# Patient Record
Sex: MALE | Race: WHITE | Employment: UNEMPLOYED | URBAN - METROPOLITAN AREA
[De-identification: names, ages, dates, MRNs, and addresses within clinical notes are randomized per-mention and may not be internally consistent; named-entity substitution may affect disease eponyms.]

---

## 2017-08-08 ENCOUNTER — OFFICE VISIT (OUTPATIENT)
Dept: INTERNAL MEDICINE CLINIC | Facility: CLINIC | Age: 62
End: 2017-08-08

## 2017-08-08 VITALS
HEART RATE: 52 BPM | TEMPERATURE: 97.6 F | BODY MASS INDEX: 34.65 KG/M2 | WEIGHT: 242 LBS | HEIGHT: 70 IN | DIASTOLIC BLOOD PRESSURE: 79 MMHG | SYSTOLIC BLOOD PRESSURE: 154 MMHG | RESPIRATION RATE: 18 BRPM

## 2017-08-08 DIAGNOSIS — Z86.73 HISTORY OF STROKE: ICD-10-CM

## 2017-08-08 DIAGNOSIS — G47.30 SLEEP APNEA, UNSPECIFIED TYPE: ICD-10-CM

## 2017-08-08 DIAGNOSIS — F32.A DEPRESSION, UNSPECIFIED DEPRESSION TYPE: ICD-10-CM

## 2017-08-08 DIAGNOSIS — J44.9 CHRONIC OBSTRUCTIVE PULMONARY DISEASE, UNSPECIFIED COPD TYPE (HCC): ICD-10-CM

## 2017-08-08 DIAGNOSIS — R94.31 ABNORMAL EKG: ICD-10-CM

## 2017-08-08 DIAGNOSIS — E11.40 TYPE 2 DIABETES MELLITUS WITH DIABETIC NEUROPATHY, WITHOUT LONG-TERM CURRENT USE OF INSULIN (HCC): ICD-10-CM

## 2017-08-08 DIAGNOSIS — F17.200 NEEDS SMOKING CESSATION EDUCATION: ICD-10-CM

## 2017-08-08 DIAGNOSIS — I10 ESSENTIAL HYPERTENSION: ICD-10-CM

## 2017-08-08 DIAGNOSIS — L98.9 SKIN LESION OF CHEEK: ICD-10-CM

## 2017-08-08 DIAGNOSIS — I63.9 CEREBROVASCULAR ACCIDENT (CVA), UNSPECIFIED MECHANISM (HCC): Primary | ICD-10-CM

## 2017-08-08 LAB
ALBUMIN UR QL STRIP: 10 MG/L
CREATININE, URINE POC: 50 MG/DL
HBA1C MFR BLD HPLC: 9.6 %
MICROALBUMIN/CREAT RATIO POC: <30 MG/G

## 2017-08-08 RX ORDER — METFORMIN HYDROCHLORIDE 1000 MG/1
1000 TABLET ORAL 2 TIMES DAILY WITH MEALS
Refills: 3 | Status: ON HOLD | COMMUNITY
Start: 2017-05-16 | End: 2021-05-28 | Stop reason: SDUPTHER

## 2017-08-08 RX ORDER — SERTRALINE HYDROCHLORIDE 50 MG/1
TABLET, FILM COATED ORAL
Refills: 0 | COMMUNITY
Start: 2017-05-14 | End: 2017-08-08 | Stop reason: DRUGHIGH

## 2017-08-08 RX ORDER — LANCETS
EACH MISCELLANEOUS
Qty: 1 EACH | Refills: 11 | Status: SHIPPED | OUTPATIENT
Start: 2017-08-08 | End: 2021-05-24

## 2017-08-08 RX ORDER — PERPHENAZINE 4 MG/1
TABLET, FILM COATED ORAL
Refills: 0 | COMMUNITY
Start: 2017-07-18 | End: 2021-05-24

## 2017-08-08 RX ORDER — PANTOPRAZOLE SODIUM 40 MG/1
40 TABLET, DELAYED RELEASE ORAL DAILY
Refills: 3 | Status: ON HOLD | COMMUNITY
Start: 2017-06-02 | End: 2021-05-28 | Stop reason: SDUPTHER

## 2017-08-08 RX ORDER — CHOLECALCIFEROL TAB 125 MCG (5000 UNIT) 125 MCG
5000 TAB ORAL DAILY
Status: ON HOLD | COMMUNITY
End: 2021-05-28 | Stop reason: SDUPTHER

## 2017-08-08 RX ORDER — INSULIN PUMP SYRINGE, 3 ML
EACH MISCELLANEOUS
Qty: 1 KIT | Refills: 0 | Status: SHIPPED | OUTPATIENT
Start: 2017-08-08 | End: 2021-05-24

## 2017-08-08 RX ORDER — CLOPIDOGREL BISULFATE 75 MG/1
75 TABLET ORAL DAILY
Refills: 0 | Status: ON HOLD | COMMUNITY
Start: 2017-06-15 | End: 2021-05-28 | Stop reason: SDUPTHER

## 2017-08-08 RX ORDER — LISINOPRIL 40 MG/1
40 TABLET ORAL DAILY
Refills: 3 | Status: ON HOLD | COMMUNITY
Start: 2017-06-20 | End: 2021-05-28 | Stop reason: SDUPTHER

## 2017-08-08 RX ORDER — GLIPIZIDE 10 MG/1
TABLET ORAL
Refills: 1 | COMMUNITY
Start: 2017-07-07 | End: 2021-05-24

## 2017-08-08 RX ORDER — ATORVASTATIN CALCIUM 80 MG/1
TABLET, FILM COATED ORAL
Refills: 3 | COMMUNITY
Start: 2017-05-16 | End: 2021-05-24

## 2017-08-08 RX ORDER — TRAZODONE HYDROCHLORIDE 100 MG/1
100 TABLET ORAL
Refills: 3 | Status: ON HOLD | COMMUNITY
Start: 2017-05-16 | End: 2021-05-28 | Stop reason: SDUPTHER

## 2017-08-08 RX ORDER — SERTRALINE HYDROCHLORIDE 100 MG/1
200 TABLET, FILM COATED ORAL DAILY
Refills: 3 | Status: ON HOLD | COMMUNITY
Start: 2017-05-16 | End: 2021-05-28 | Stop reason: SDUPTHER

## 2017-08-08 NOTE — MR AVS SNAPSHOT
Visit Information Date & Time Provider Department Dept. Phone Encounter #  
 8/8/2017 10:15 AM Orly Ambrosio NP [facility] [phone] [id number] Follow-up Instructions Return in about 3 months (around 11/8/2017) for Please sign record release forms at . Upcoming Health Maintenance Date Due Pneumococcal 19-64 Medium Risk (1 of 1 - PPSV23) 8/10/1973 DTaP/Tdap/Td series (1 - Tdap) 8/10/1975 FOBT Q 1 YEAR AGE 50-75 8/10/2004 ZOSTER VACCINE AGE 60> 6/10/2014 INFLUENZA AGE 9 TO ADULT 8/1/2017 Allergies as of 8/8/2017  Review Complete On: 8/8/2017 By: Orly Ambrosio NP Severity Noted Reaction Type Reactions Egg  09/05/2012    Swelling Current Immunizations  Never Reviewed No immunizations on file. Not reviewed this visit You Were Diagnosed With   
  
 Codes Comments Cerebrovascular accident (CVA), unspecified mechanism ([facility]    -  Primary ICD-10-CM: I63.9 ICD-9-CM: 434.91 Type 2 diabetes mellitus with diabetic neuropathy, without long-term current use of insulin (HCC)     ICD-10-CM: E11.40 ICD-9-CM: 250.60, 357.2 Essential hypertension     ICD-10-CM: I10 
ICD-9-CM: 401.9 Needs smoking cessation education     ICD-10-CM: F17.200 ICD-9-CM: V62.3 Depression, unspecified depression type     ICD-10-CM: F32.9 ICD-9-CM: 376 History of stroke     ICD-10-CM: Z86.73 
ICD-9-CM: V12.54 Skin lesion of cheek     ICD-10-CM: L98.9 ICD-9-CM: 709.9 Chronic obstructive pulmonary disease, unspecified COPD type ([facility]     ICD-10-CM: J44.9 ICD-9-CM: 493 Sleep apnea, unspecified type     ICD-10-CM: G47.30 ICD-9-CM: 780.57 Vitals BP Pulse Temp Resp Height(growth percentile) Weight(growth percentile) 154/79 (BP 1 Location: Right arm, BP Patient Position: Sitting) (!) 52 97.6 °F (36.4 °C) (Oral) 18 5' 10\" (1.778 m) 242 lb (109.8 kg) BMI Smoking Status 34.72 kg/m2 Current Every Day Smoker Vitals History BMI and BSA Data Body Mass Index Body Surface Area 34.72 kg/m 2 2.33 m 2 Preferred Pharmacy Pharmacy Name Phone [id number], [address] [facility] [phone] Your Updated Medication List  
  
   
This list is accurate as of: 8/8/17 11:13 AM.  Always use your most recent med list.  
  
  
  
  
 atorvastatin 80 mg tablet Commonly known as:  LIPITOR  
TAKE 1 TABLET BY MOUTH EVERY DAY  
  
 cholecalciferol (VITAMIN D3) 5,000 unit Tab tablet Commonly known as:  VITAMIN D3 Take  by mouth daily. clopidogrel 75 mg Tab Commonly known as:  PLAVIX TAKE ONE TABLET BY MOUTH DAILY  
  
 glipiZIDE 10 mg tablet Commonly known as:  GLUCOTROL  
TAKE 1 TABLET BY ORAL ROUTE 2 TIMES EVERY DAY BEFORE A MEAL FOR E11.69  
  
 lisinopril 40 mg tablet Commonly known as:  PRINIVIL, ZESTRIL  
TAKE 1 TABLET BY MOUTH EVERY DAY  
  
 metFORMIN 1,000 mg tablet Commonly known as:  GLUCOPHAGE  
TAKE 1 TABLET BY MOUTH TWICE A DAY pantoprazole 40 mg tablet Commonly known as:  PROTONIX  
TAKE 1 TABLET BY MOUTH EVERY DAY  
  
 perphenazine 4 mg tablet Commonly known as:  TRILAFON  
TAKE 1 TABLET BY MOUTH AT BEDTIME  
  
 sertraline 100 mg tablet Commonly known as:  ZOLOFT  
TAKE 1 TABLET BY MOUTH EVERY DAY  
  
 traZODone 100 mg tablet Commonly known as:  DESYREL  
TAKE 1 TABLET BY MOUTH EVERY DAY AT BEDTIME We Performed the Following AMB POC EKG ROUTINE W/ 12 LEADS, INTER & REP [70909 CPT(R)] AMB POC HEMOGLOBIN A1C [75663 CPT(R)] AMB POC URINE, MICROALBUMIN, SEMIQUANT (3 RESULTS) [26099 CPT(R)] REFERRAL TO CARDIOLOGY [NWT07 Custom] REFERRAL TO DERMATOLOGY [REF19 Custom] REFERRAL TO NEUROLOGY [HWJ67 Custom] REFERRAL TO PULMONARY DISEASE [WFH66 Custom] Comments:  
 Ronan Newsome. MD Ori 
[facility] [address] [facility] [address] Phone: [phone] Fax: [phone] REFERRAL TO SLEEP STUDIES [REF99 Custom] Follow-up Instructions Return in about 3 months (around 11/8/2017) for Please sign record release forms at . Referral Information Referral ID Referred By Referred To  
  
 [id number] SKIFF, [address], DO   
   [address] [facility] [address] [facility] Kit [place], [place] Phone: [phone] Fax: [phone] Visits Status Start Date End Date 1 Authorized 8/8/17 8/8/18 If your referral has a status of pending review or denied, additional information will be sent to support the outcome of this decision. Referral ID Referred By Referred To  
 [id number] SKIFF, [place], [address] [place] [address], [facility] Phone: [phone] Fax: [phone] Visits Status Start Date End Date 1 New Request 8/8/17 8/8/18 If your referral has a status of pending review or denied, additional information will be sent to support the outcome of this decision. Referral ID Referred By Referred To  
 [id number] SKIFF, [place] Raven, NP  
   [address] [place] [address] [address] Phone: [phone] Fax: [phone] Visits Status Start Date End Date 1 New Request 8/8/17 8/8/18 If your referral has a status of pending review or denied, additional information will be sent to support the outcome of this decision. Referral ID Referred By Referred To  
 [id number] Vandana Harris [facility] [address] [facility], [address] [facility] Visits Status Start Date End Date 1 New Request 8/8/17 8/8/18 If your referral has a status of pending review or denied, additional information will be sent to support the outcome of this decision. Referral ID Referred By Referred To  
 [id number] SKIFF, Karolyn Balls, MD  
   [address] [place] [address] Karen Zheng Phone: [phone] Fax: [phone] Visits Status Start Date End Date 1 New Request 8/8/17 8/8/18 If your referral has a status of pending review or denied, additional information will be sent to support the outcome of this decision. Patient Instructions Secondhand Smoke: Care Instructions Your Care Instructions Secondhand smoke comes from the burning end of a cigarette, cigar, or pipe and the smoke that a smoker exhales. The smoke contains nicotine and many other harmful chemicals. Breathing secondhand smoke can cause or worsen health problems including cancer, asthma, coronary artery disease, and respiratory infections. It can make your eyes and nose burn and cause a sore throat. Secondhand smoke is especially bad for babies and young children whose lungs are still developing. Babies whose parents smoke are more likely to have ear infections, pneumonia, and bronchitis in the first few years of their lives. Secondhand smoke can make asthma symptoms worse in children. If you are pregnant, it is important that you not smoke and that you avoid secondhand smoke. You are more likely to give birth to a baby who weighs less than expected (low birth weight) if you smoke. And your baby may have a greater risk for sudden infant death syndrome (SIDS). Babies whose mothers are exposed to secondhand smoke during pregnancy have a higher risk for health problems. Follow-up care is a key part of your treatment and safety. Be sure to make and go to all appointments, and call your doctor if you are having problems. It's also a good idea to know your test results and keep a list of the medicines you take. How can you care for yourself at home? · Do not smoke or let anyone else smoke in your home. If people must smoke, ask them to go outside. · If people do smoke in your home, choose a room where you can open a window or use a fan to get the smoke outside. · Do not let anyone smoke in your car. If someone must smoke, pull over in a safe place and let him or her smoke away from the car. · Ask your employer to make sure that you have a smoke-free work area. · Make sure that your children are not exposed to secondhand smoke at day care, school, and after-school programs. · Try to choose nonsmoking bars, restaurants, and other public places when you go out. · Help your family and friends who smoke to quit by encouraging them to try. Tell them about treatment resources. Having support from others often helps. · If you smoke, quit. Quitting is hard, but there are ways to boost your chance of quitting tobacco for good. ¨ Use nicotine gum, patches, or lozenges. Call a quitline. Ask your doctor about stop-smoking programs and medicines. ¨ Keep trying. When should you call for help? Watch closely for changes in your health, and be sure to contact your doctor if you have any problems. Where can you learn more? Go to http://figueroaReactivitymarilyn.info/. Enter L004 in the search box to learn more about \"Secondhand Smoke: Care Instructions. \" Current as of: March 20, 2017 Content Version: 11.3 © 8692-1675 [employer]. Care instructions adapted under license by Celect (which disclaims liability or warranty for this information). If you have questions about a medical condition or this instruction, always ask your healthcare professional. [place] [address] any warranty or liability for your use of this information. Learning About Benefits From Quitting Smoking How does quitting smoking make you healthier? If you're thinking about quitting smoking, you may have a few reasons to be smoke-free. Your health may be one of them.  
· When you quit smoking, you lower your risks for cancer, lung disease, heart attack, stroke, blood vessel disease, and blindness from macular degeneration. · When you're smoke-free, you get sick less often, and you heal faster. You are less likely to get colds, flu, bronchitis, and pneumonia. · As a nonsmoker, you may find that your mood is better and you are less stressed. When and how will you feel healthier? Quitting has real health benefits that start from day 1 of being smoke-free. And the longer you stay smoke-free, the healthier you get and the better you feel. The first hours · After just 20 minutes, your blood pressure and heart rate go down. That means there's less stress on your heart and blood vessels. · Within 12 hours, the level of carbon monoxide in your blood drops back to normal. That makes room for more oxygen. With more oxygen in your body, you may notice that you have more energy than when you smoked. After 2 weeks · Your lungs start to work better. · Your risk of heart attack starts to drop. After 1 month · When your lungs are clear, you cough less and breathe deeper, so it's easier to be active. · Your sense of taste and smell return. That means you can enjoy food more than you have since you started smoking. Over the years · After 1 year, your risk of heart disease is half what it would be if you kept smoking. · After 5 years, your risk of stroke starts to shrink. Within a few years after that, it's about the same as if you'd never smoked. · After 10 years, your risk of dying from lung cancer is cut by about half. And your risk for many other types of cancer is lower too. How would quitting help others in your life? When you quit smoking, you improve the health of everyone who now breathes in your smoke. · Their heart, lung, and cancer risks drop, much like yours. · They are sick less. For babies and small children, living smoke-free means they're less likely to have ear infections, pneumonia, and bronchitis. · If you're a woman who is or will be pregnant someday, quitting smoking means a healthier . · Children who are close to you are less likely to become adult smokers. Where can you learn more? Go to http://figueroa-marilyn.info/. Enter [id number] in the search box to learn more about \"Learning About Benefits From Quitting Smoking. \" Current as of: 2017 Content Version: 11.3 © 2044-9285 [employer]. Care instructions adapted under license by Visto (which disclaims liability or warranty for this information). If you have questions about a medical condition or this instruction, always ask your healthcare professional. [address] any warranty or liability for your use of this information. Deciding About Using Medicines To Quit Smoking What are the medicines you can use? Your doctor may prescribe varenicline (Chantix) or bupropion (Zyban). These medicines can help you cope with cravings for tobacco. They are pills that don't contain nicotine. You also can use nicotine replacement products. These do contain nicotine. There are many types. · Gum and lozenges slowly release nicotine into your mouth. · Patches stick to your skin. They slowly release nicotine into your bloodstream. 
· An inhaler has a bianchi that contains nicotine. You breathe in a puff of nicotine vapor through your mouth and throat. · Nasal spray releases a mist that contains nicotine. What are key points about this decision? · Using medicines can double your chances of quitting smoking. They can ease cravings and withdrawal symptoms. · Getting counseling along with using medicine can raise your chances of quitting even more. · If you smoke fewer than 5 cigarettes a day, you may not need medicines to help you quit smoking. · These medicines have less nicotine than cigarettes.  And by itself, nicotine is not nearly as harmful as smoking. The tars, carbon monoxide, and other toxic chemicals in tobacco cause the harmful effects. · The side effects of nicotine replacement products depend on the type of product. For example, a patch can make your skin red and itchy. Medicines in pill form can make you sick to your stomach. They can also cause dry mouth and trouble sleeping. For most people, the side effects are not bad enough to make them stop using the products. · FDA warning. The U.S. Food and Drug Administration (FDA) warns that people who are taking bupropion or varenicline and who have any serious or unusual changes in mood or behavior or who feel like hurting themselves or someone else should stop taking the medicine and call a doctor right away. If you already have a mood or behavior problem, be sure to tell your doctor before you decide to use these medicines. Why might you choose to use medicines to quit smoking? · You have tried on your own to stop smoking, but you were not able to stop. · You smoke more than 5 cigarettes a day. · You want to increase your chances of quitting smoking. · You want to reduce your cravings and withdrawal symptoms. · You feel the benefits of medicine outweigh the side effects. Why might you choose not to use medicine? · You want to try quitting on your own by stopping all at once (\"cold turkey\"). · You want to cut back slowly on the number of cigarettes you smoke. · You smoke fewer than 5 cigarettes a day. · You do not like using medicine. · You feel the side effects of medicines outweigh the benefits. · You are worried about the cost of medicines. Your decision Thinking about the facts and your feelings can help you make a decision that is right for you. Be sure you understand the benefits and risks of your options, and think about what else you need to do before you make the decision. Where can you learn more? Go to http://figueroa-marilyn.info/. Enter S228 in the search box to learn more about \"Deciding About Using Medicines To Quit Smoking. \" Current as of: March 20, 2017 Content Version: 11.3 © 5171-1176 [employer]. Care instructions adapted under license by Vivorte (which disclaims liability or warranty for this information). If you have questions about a medical condition or this instruction, always ask your healthcare professional. [address] any warranty or liability for your use of this information. Introducing [facility]! Carmelo Faust introduces [employer] patient portal. Now you can access parts of your medical record, email your doctor's office, and request medication refills online. 1. In your internet browser, go to [email or web address] 2. Click on the First Time User? Click Here link in the Sign In box. You will see the New Member Sign Up page. 3. Enter your [employer] Access Code exactly as it appears below. You will not need to use this code after youve completed the sign-up process. If you do not sign up before the expiration date, you must request a new code. · [employer] Access Code: [email or web address] Expires: 11/6/2017 11:13 AM 
 
4. Enter the last four digits of your Social Security Number (xxxx) and Date of Birth (mm/dd/yyyy) as indicated and click Submit. You will be taken to the next sign-up page. 5. Create a [employer] ID. This will be your [employer] login ID and cannot be changed, so think of one that is secure and easy to remember. 6. Create a [employer] password. You can change your password at any time. 7. Enter your Password Reset Question and Answer. This can be used at a later time if you forget your password. 8. Enter your e-mail address. You will receive e-mail notification when new information is available in [address] 9. Click Sign Up. You can now view and download portions of your medical record. 10. Click the Download Summary menu link to download a portable copy of your medical information. If you have questions, please visit the Frequently Asked Questions section of the RaySat website. Remember, [employer] is NOT to be used for urgent needs. For medical emergencies, dial 911. Now available from your iPhone and Android! Please provide this summary of care documentation to your next provider. Your primary care clinician is listed as Phys Other. If you have any questions after today's visit, please call [phone].

## 2017-08-08 NOTE — PROGRESS NOTES
Chief Complaint   Patient presents with    Establish Care     Pt reports he had a stroke about 6 weeks ago this was his 3rd stroke. 1. Have you been to the ER, urgent care clinic since your last visit? Hospitalized since your last visit? No    2. Have you seen or consulted any other health care providers outside of the [address] [place] [address] since your last visit? Include any pap smears or colon screening.  No

## 2017-08-08 NOTE — PROGRESS NOTES
Subjective:      Esequiel Mcfarland is a 57 y.o. male who is a new patient and is here to establish care and discuss recent stroke. Previous followed by Dr. Ru Christina. The following sections were reviewed & updated as appropriate: PMH, PL, PSH, FH, RxH, and SH. He will see Dr. Henrique Duran on August 15th to establish care. He does not have any specialists. He requests disability paperwork for [facility], discussed that I need previous records before doing that. Cardiovascular Review  The patient has hypertension and history of prior stroke. His last stroke was 6 weeks ago, he was seen at [facility].  He reports taking medications as instructed, no medication side effects noted. Diet and Lifestyle: not attempting to follow a low fat, low cholesterol diet, sedentary, smoker 1 pack daily. Labs: no lab studies available for review at time of visit. Lab Results   Component Value Date/Time    Sodium 140 10/23/2016 05:55 PM    Potassium 4.1 10/23/2016 05:55 PM       Endocrine Review  He is seen for diabetes. Since last visit he reports: no polyuria or polydipsia, no chest pain or dyspnea, no chest pain, dyspnea or TIA's, no numbness, tingling or pain in extremities, no unusual visual symptoms, no significant changes. Home glucose monitoring: is not performed   He reports medication compliance: compliant all of the time. Medication side effects: none. Diabetic diet compliance: noncompliant much of the time.   Lab review: A1c today is 9.6    Lab Results   Component Value Date/Time    Creatinine 0.73 10/23/2016 05:55 PM       No Diabetic HM Topics for this patient    Patient Active Problem List    Diagnosis Date Noted    Needs smoking cessation education 08/08/2017    History of stroke 08/08/2017    Depression 08/08/2017    Type 2 diabetes mellitus with diabetic neuropathy, without long-term current use of insulin ([facility] 08/08/2017    Essential hypertension 08/08/2017    Sleep apnea 08/08/2017    Alcohol-induced mood disorder ([facility] 09/06/2012     Current Outpatient Prescriptions   Medication Sig Dispense Refill    atorvastatin (LIPITOR) 80 mg tablet TAKE 1 TABLET BY MOUTH EVERY DAY  3    clopidogrel (PLAVIX) 75 mg tab TAKE ONE TABLET BY MOUTH DAILY  0    glipiZIDE (GLUCOTROL) 10 mg tablet TAKE 1 TABLET BY ORAL ROUTE 2 TIMES EVERY DAY BEFORE A MEAL FOR E11.69  1    lisinopril (PRINIVIL, ZESTRIL) 40 mg tablet TAKE 1 TABLET BY MOUTH EVERY DAY  3    metFORMIN (GLUCOPHAGE) 1,000 mg tablet TAKE 1 TABLET BY MOUTH TWICE A DAY  3    pantoprazole (PROTONIX) 40 mg tablet TAKE 1 TABLET BY MOUTH EVERY DAY  3    sertraline (ZOLOFT) 100 mg tablet TAKE 1 TABLET BY MOUTH EVERY DAY  3    traZODone (DESYREL) 100 mg tablet TAKE 1 TABLET BY MOUTH EVERY DAY AT BEDTIME  3    cholecalciferol, VITAMIN D3, (VITAMIN D3) 5,000 unit tab tablet Take  by mouth daily.  Blood-Glucose Meter monitoring kit Use twice daily to check blood sugars 1 Kit 0    Lancets misc Use twice daily to check blood sugars 1 Each 11    glucose blood VI test strips (ASCENSIA AUTODISC VI, ONE TOUCH ULTRA TEST VI) strip Use twice daily to check blood sugars 100 Strip 11    perphenazine (TRILAFON) 4 mg tablet TAKE 1 TABLET BY MOUTH AT BEDTIME  0     Allergies   Allergen Reactions    Egg Swelling     Past Medical History:   Diagnosis Date    Bipolar 2 disorder ([facility]     Depression     DM (diabetes mellitus) ([facility]     ETOH abuse     HTN (hypertension)     Stroke ([place] [address]      History reviewed. No pertinent family history. Social History   Substance Use Topics    Smoking status: Current Every Day Smoker     Packs/day: 1.00    Smokeless tobacco: Never Used    Alcohol use Yes      Comment: stopped drinking 4 years ago        Review of Systems    A comprehensive review of systems was negative except for that written in the HPI.      Objective:     Visit Vitals    /79 (BP 1 Location: Right arm, BP Patient Position: Sitting)    Pulse (!) 52    Temp 97.6 °F (36.4 °C) (Oral)    Resp 18    Ht 5' 10\" (1.778 m)    Wt 242 lb (109.8 kg)    BMI 34.72 kg/m2     General appearance: alert, cooperative, no distress, appears stated age, obese  Head: Normocephalic, without obvious abnormality, atraumatic  Eyes: pin point pupils, PERRL  Back: symmetric, no curvature. ROM normal. No CVA tenderness. Lungs: clear to auscultation bilaterally  Heart: regular rate and rhythm, S1, S2 normal, no murmur, click, rub or gallop  Extremities: extremities normal, atraumatic, no cyanosis or edema  Pulses: 2+ and symmetric  Skin: abnormal mole noted to right cheek  Neurologic: Alert and oriented X 3, normal strength and tone. Normal symmetric reflexes. Normal coordination and gait  Psych: appropriate mood, speech, affect, uses humor to deflect serious questions. Nursing note and vitals reviewed  Assessment/Plan:       ICD-10-CM ICD-9-CM    1. Cerebrovascular accident (CVA), unspecified mechanism ([facility] I63.9 434.91 REFERRAL TO NEUROLOGY      REFERRAL TO CARDIOLOGY   2. Type 2 diabetes mellitus with diabetic neuropathy, without long-term current use of insulin (HCC) E11.40 250.60 AMB POC HEMOGLOBIN A1C     357.2 AMB POC URINE, MICROALBUMIN, SEMIQUANT (3 RESULTS)      Blood-Glucose Meter monitoring kit      Lancets misc      glucose blood VI test strips (ASCENSIA AUTODISC VI, ONE TOUCH ULTRA TEST VI) strip   3. Essential hypertension I10 401.9 REFERRAL TO CARDIOLOGY      AMB POC EKG ROUTINE W/ 12 LEADS, INTER & REP   4. Abnormal EKG R94.31 794.31    5. Needs smoking cessation education F17.200 V62.3    6. Depression, unspecified depression type F32.9 311    7. History of stroke Z86.73 V12.54    8. Skin lesion of cheek L98.9 709.9 REFERRAL TO DERMATOLOGY   9. Chronic obstructive pulmonary disease, unspecified COPD type ([facility] [address] [facility] J44.9 496 REFERRAL TO PULMONARY DISEASE   10.  Sleep apnea, unspecified type G47.30 780.57 REFERRAL TO SLEEP STUDIES     Follow-up Disposition:  Return in about 3 months (around 11/8/2017) for Please sign record release forms at . Advised him to call back or return to office if symptoms worsen/change/persist.  Discussed expected course/resolution/complications of diagnosis in detail with patient. Medication risks/benefits/costs/interactions/alternatives discussed with patient. He was given an after visit summary which includes diagnoses, current medications, & vitals. He expressed understanding with the diagnosis and plan.

## 2017-08-08 NOTE — PATIENT INSTRUCTIONS
Secondhand Smoke: Care Instructions  Your Care Instructions  Secondhand smoke comes from the burning end of a cigarette, cigar, or pipe and the smoke that a smoker exhales. The smoke contains nicotine and many other harmful chemicals. Breathing secondhand smoke can cause or worsen health problems including cancer, asthma, coronary artery disease, and respiratory infections. It can make your eyes and nose burn and cause a sore throat. Secondhand smoke is especially bad for babies and young children whose lungs are still developing. Babies whose parents smoke are more likely to have ear infections, pneumonia, and bronchitis in the first few years of their lives. Secondhand smoke can make asthma symptoms worse in children. If you are pregnant, it is important that you not smoke and that you avoid secondhand smoke. You are more likely to give birth to a baby who weighs less than expected (low birth weight) if you smoke. And your baby may have a greater risk for sudden infant death syndrome (SIDS). Babies whose mothers are exposed to secondhand smoke during pregnancy have a higher risk for health problems. Follow-up care is a key part of your treatment and safety. Be sure to make and go to all appointments, and call your doctor if you are having problems. It's also a good idea to know your test results and keep a list of the medicines you take. How can you care for yourself at home? · Do not smoke or let anyone else smoke in your home. If people must smoke, ask them to go outside. · If people do smoke in your home, choose a room where you can open a window or use a fan to get the smoke outside. · Do not let anyone smoke in your car. If someone must smoke, pull over in a safe place and let him or her smoke away from the car. · Ask your employer to make sure that you have a smoke-free work area.   · Make sure that your children are not exposed to secondhand smoke at day care, school, and after-school programs. · Try to choose nonsmoking bars, restaurants, and other public places when you go out. · Help your family and friends who smoke to quit by encouraging them to try. Tell them about treatment resources. Having support from others often helps. · If you smoke, quit. Quitting is hard, but there are ways to boost your chance of quitting tobacco for good. ¨ Use nicotine gum, patches, or lozenges. Call a quitline. Ask your doctor about stop-smoking programs and medicines. ¨ Keep trying. When should you call for help? Watch closely for changes in your health, and be sure to contact your doctor if you have any problems. Where can you learn more? Go to http://figueroaBocadamarilyn.info/. Enter L004 in the search box to learn more about \"Secondhand Smoke: Care Instructions. \"  Current as of: March 20, 2017  Content Version: 11.3  © 2443-7695 [employer]. Care instructions adapted under license by [employer] (which disclaims liability or warranty for this information). If you have questions about a medical condition or this instruction, always ask your healthcare professional. [address] any warranty or liability for your use of this information. Learning About Benefits From Quitting Smoking  How does quitting smoking make you healthier? If you're thinking about quitting smoking, you may have a few reasons to be smoke-free. Your health may be one of them. · When you quit smoking, you lower your risks for cancer, lung disease, heart attack, stroke, blood vessel disease, and blindness from macular degeneration. · When you're smoke-free, you get sick less often, and you heal faster. You are less likely to get colds, flu, bronchitis, and pneumonia. · As a nonsmoker, you may find that your mood is better and you are less stressed. When and how will you feel healthier? Quitting has real health benefits that start from day 1 of being smoke-free. And the longer you stay smoke-free, the healthier you get and the better you feel. The first hours  · After just 20 minutes, your blood pressure and heart rate go down. That means there's less stress on your heart and blood vessels. · Within 12 hours, the level of carbon monoxide in your blood drops back to normal. That makes room for more oxygen. With more oxygen in your body, you may notice that you have more energy than when you smoked. After 2 weeks  · Your lungs start to work better. · Your risk of heart attack starts to drop. After 1 month  · When your lungs are clear, you cough less and breathe deeper, so it's easier to be active. · Your sense of taste and smell return. That means you can enjoy food more than you have since you started smoking. Over the years  · After 1 year, your risk of heart disease is half what it would be if you kept smoking. · After 5 years, your risk of stroke starts to shrink. Within a few years after that, it's about the same as if you'd never smoked. · After 10 years, your risk of dying from lung cancer is cut by about half. And your risk for many other types of cancer is lower too. How would quitting help others in your life? When you quit smoking, you improve the health of everyone who now breathes in your smoke. · Their heart, lung, and cancer risks drop, much like yours. · They are sick less. For babies and small children, living smoke-free means they're less likely to have ear infections, pneumonia, and bronchitis. · If you're a woman who is or will be pregnant someday, quitting smoking means a healthier . · Children who are close to you are less likely to become adult smokers. Where can you learn more? Go to http://figueroa-marilyn.info/. Enter [id number] [id number] [id number] in the search box to learn more about \"Learning About Benefits From Quitting Smoking. \"  Current as of: 2017  Content Version: 11.3  © 6153-1689 [employer].  Care instructions adapted under license by [employer] (which disclaims liability or warranty for this information). If you have questions about a medical condition or this instruction, always ask your healthcare professional. [address] any warranty or liability for your use of this information. Deciding About Using Medicines To Quit Smoking  What are the medicines you can use? Your doctor may prescribe varenicline (Chantix) or bupropion (Zyban). These medicines can help you cope with cravings for tobacco. They are pills that don't contain nicotine. You also can use nicotine replacement products. These do contain nicotine. There are many types. · Gum and lozenges slowly release nicotine into your mouth. · Patches stick to your skin. They slowly release nicotine into your bloodstream.  · An inhaler has a bianchi that contains nicotine. You breathe in a puff of nicotine vapor through your mouth and throat. · Nasal spray releases a mist that contains nicotine. What are key points about this decision? · Using medicines can double your chances of quitting smoking. They can ease cravings and withdrawal symptoms. · Getting counseling along with using medicine can raise your chances of quitting even more. · If you smoke fewer than 5 cigarettes a day, you may not need medicines to help you quit smoking. · These medicines have less nicotine than cigarettes. And by itself, nicotine is not nearly as harmful as smoking. The tars, carbon monoxide, and other toxic chemicals in tobacco cause the harmful effects. · The side effects of nicotine replacement products depend on the type of product. For example, a patch can make your skin red and itchy. Medicines in pill form can make you sick to your stomach. They can also cause dry mouth and trouble sleeping. For most people, the side effects are not bad enough to make them stop using the products. · FDA warning. The U.S.  Food and Drug Administration (FDA) warns that people who are taking bupropion or varenicline and who have any serious or unusual changes in mood or behavior or who feel like hurting themselves or someone else should stop taking the medicine and call a doctor right away. If you already have a mood or behavior problem, be sure to tell your doctor before you decide to use these medicines. Why might you choose to use medicines to quit smoking? · You have tried on your own to stop smoking, but you were not able to stop. · You smoke more than 5 cigarettes a day. · You want to increase your chances of quitting smoking. · You want to reduce your cravings and withdrawal symptoms. · You feel the benefits of medicine outweigh the side effects. Why might you choose not to use medicine? · You want to try quitting on your own by stopping all at once (\"cold turkey\"). · You want to cut back slowly on the number of cigarettes you smoke. · You smoke fewer than 5 cigarettes a day. · You do not like using medicine. · You feel the side effects of medicines outweigh the benefits. · You are worried about the cost of medicines. Your decision  Thinking about the facts and your feelings can help you make a decision that is right for you. Be sure you understand the benefits and risks of your options, and think about what else you need to do before you make the decision. Where can you learn more? Go to http://figueroa-marilyn.info/. Enter O857 in the search box to learn more about \"Deciding About Using Medicines To Quit Smoking. \"  Current as of: March 20, 2017  Content Version: 11.3  © 1565-9817 [employer]. Care instructions adapted under license by [employer] (which disclaims liability or warranty for this information).  If you have questions about a medical condition or this instruction, always ask your healthcare professional. [place] [address] any warranty or liability for your use of this information.

## 2021-05-23 ENCOUNTER — HOSPITAL ENCOUNTER (INPATIENT)
Age: 66
LOS: 4 days | Discharge: HOME | DRG: 885 | End: 2021-05-28
Attending: EMERGENCY MEDICINE | Admitting: PSYCHIATRY & NEUROLOGY
Payer: MEDICARE

## 2021-05-23 DIAGNOSIS — F43.21 ADJUSTMENT DISORDER WITH DEPRESSED MOOD: ICD-10-CM

## 2021-05-23 DIAGNOSIS — F10.10 ALCOHOL ABUSE: ICD-10-CM

## 2021-05-23 DIAGNOSIS — E11.40 TYPE 2 DIABETES MELLITUS WITH DIABETIC NEUROPATHY, WITHOUT LONG-TERM CURRENT USE OF INSULIN (HCC): ICD-10-CM

## 2021-05-23 DIAGNOSIS — F19.94 SUBSTANCE INDUCED MOOD DISORDER (HCC): Primary | ICD-10-CM

## 2021-05-23 DIAGNOSIS — F25.1 SCHIZOAFFECTIVE DISORDER, DEPRESSIVE TYPE (HCC): ICD-10-CM

## 2021-05-23 DIAGNOSIS — I10 ESSENTIAL HYPERTENSION: ICD-10-CM

## 2021-05-23 DIAGNOSIS — F10.20 ALCOHOL USE DISORDER, MODERATE, DEPENDENCE (HCC): ICD-10-CM

## 2021-05-23 LAB
ALBUMIN SERPL-MCNC: 4.1 G/DL (ref 3.5–5)
ALBUMIN/GLOB SERPL: 1.2 {RATIO} (ref 1.1–2.2)
ALP SERPL-CCNC: 59 U/L (ref 45–117)
ALT SERPL-CCNC: 68 U/L (ref 12–78)
AMPHET UR QL SCN: NEGATIVE
ANION GAP SERPL CALC-SCNC: 12 MMOL/L (ref 5–15)
AST SERPL-CCNC: 36 U/L (ref 15–37)
BARBITURATES UR QL SCN: NEGATIVE
BASOPHILS # BLD: 0.1 K/UL (ref 0–0.1)
BASOPHILS NFR BLD: 2 % (ref 0–1)
BENZODIAZ UR QL: NEGATIVE
BILIRUB SERPL-MCNC: 0.3 MG/DL (ref 0.2–1)
BUN SERPL-MCNC: 8 MG/DL (ref 6–20)
BUN/CREAT SERPL: 9 (ref 12–20)
CALCIUM SERPL-MCNC: 9.1 MG/DL (ref 8.5–10.1)
CANNABINOIDS UR QL SCN: NEGATIVE
CHLORIDE SERPL-SCNC: 98 MMOL/L (ref 97–108)
CO2 SERPL-SCNC: 26 MMOL/L (ref 21–32)
COCAINE UR QL SCN: NEGATIVE
CREAT SERPL-MCNC: 0.93 MG/DL (ref 0.7–1.3)
DIFFERENTIAL METHOD BLD: ABNORMAL
DRUG SCRN COMMENT,DRGCM: NORMAL
EOSINOPHIL # BLD: 0.1 K/UL (ref 0–0.4)
EOSINOPHIL NFR BLD: 3 % (ref 0–7)
ERYTHROCYTE [DISTWIDTH] IN BLOOD BY AUTOMATED COUNT: 12.6 % (ref 11.5–14.5)
ETHANOL SERPL-MCNC: 154 MG/DL
FLUAV RNA SPEC QL NAA+PROBE: NOT DETECTED
FLUBV RNA SPEC QL NAA+PROBE: NOT DETECTED
GLOBULIN SER CALC-MCNC: 3.4 G/DL (ref 2–4)
GLUCOSE SERPL-MCNC: 185 MG/DL (ref 65–100)
HCT VFR BLD AUTO: 40 % (ref 36.6–50.3)
HGB BLD-MCNC: 14.1 G/DL (ref 12.1–17)
IMM GRANULOCYTES # BLD AUTO: 0 K/UL (ref 0–0.04)
IMM GRANULOCYTES NFR BLD AUTO: 0 % (ref 0–0.5)
LYMPHOCYTES # BLD: 1.2 K/UL (ref 0.8–3.5)
LYMPHOCYTES NFR BLD: 25 % (ref 12–49)
MCH RBC QN AUTO: 32 PG (ref 26–34)
MCHC RBC AUTO-ENTMCNC: 35.3 G/DL (ref 30–36.5)
MCV RBC AUTO: 90.7 FL (ref 80–99)
METHADONE UR QL: NEGATIVE
MONOCYTES # BLD: 0.3 K/UL (ref 0–1)
MONOCYTES NFR BLD: 7 % (ref 5–13)
NEUTS SEG # BLD: 3 K/UL (ref 1.8–8)
NEUTS SEG NFR BLD: 63 % (ref 32–75)
NRBC # BLD: 0 K/UL (ref 0–0.01)
NRBC BLD-RTO: 0 PER 100 WBC
OPIATES UR QL: NEGATIVE
PCP UR QL: NEGATIVE
PLATELET # BLD AUTO: 165 K/UL (ref 150–400)
PMV BLD AUTO: 10.4 FL (ref 8.9–12.9)
POTASSIUM SERPL-SCNC: 3.9 MMOL/L (ref 3.5–5.1)
PROT SERPL-MCNC: 7.5 G/DL (ref 6.4–8.2)
RBC # BLD AUTO: 4.41 M/UL (ref 4.1–5.7)
SARS-COV-2, COV2: NOT DETECTED
SODIUM SERPL-SCNC: 136 MMOL/L (ref 136–145)
WBC # BLD AUTO: 4.7 K/UL (ref 4.1–11.1)

## 2021-05-23 PROCEDURE — 36415 COLL VENOUS BLD VENIPUNCTURE: CPT

## 2021-05-23 PROCEDURE — 80053 COMPREHEN METABOLIC PANEL: CPT

## 2021-05-23 PROCEDURE — 85025 COMPLETE CBC W/AUTO DIFF WBC: CPT

## 2021-05-23 PROCEDURE — 87636 SARSCOV2 & INF A&B AMP PRB: CPT

## 2021-05-23 PROCEDURE — 99285 EMERGENCY DEPT VISIT HI MDM: CPT

## 2021-05-23 PROCEDURE — 74011250637 HC RX REV CODE- 250/637: Performed by: EMERGENCY MEDICINE

## 2021-05-23 PROCEDURE — 82077 ASSAY SPEC XCP UR&BREATH IA: CPT

## 2021-05-23 PROCEDURE — 74011250637 HC RX REV CODE- 250/637: Performed by: NURSE PRACTITIONER

## 2021-05-23 PROCEDURE — 80307 DRUG TEST PRSMV CHEM ANLYZR: CPT

## 2021-05-23 RX ORDER — IBUPROFEN 200 MG
1 TABLET ORAL DAILY
Status: DISCONTINUED | OUTPATIENT
Start: 2021-05-23 | End: 2021-05-28 | Stop reason: HOSPADM

## 2021-05-23 RX ORDER — IBUPROFEN 200 MG
1 TABLET ORAL DAILY
Status: DISCONTINUED | OUTPATIENT
Start: 2021-05-24 | End: 2021-05-23

## 2021-05-23 RX ORDER — LORAZEPAM 1 MG/1
1 TABLET ORAL
Status: COMPLETED | OUTPATIENT
Start: 2021-05-23 | End: 2021-05-23

## 2021-05-23 RX ADMIN — LORAZEPAM 1 MG: 1 TABLET ORAL at 22:05

## 2021-05-23 NOTE — ED NOTES
Bedside shift change report given to Sara Dan RN (oncoming nurse) by Davian Escobar RN (offgoing nurse). Report included the following information SBAR, ED Summary and Recent Results.

## 2021-05-23 NOTE — BSMART NOTE
Comprehensive Assessment Form Part 1 Section I - Disposition Axis I - Substance Induced Mood Disorder, Alcohol Dependence Axis II - Deferred Axis III - Hypertension, Diabetes, Hx of stroke, COPD Axis IV - Treatment noncompliance, Chronic substance abuse [place] - 35 The Medical Doctor to Psychiatrist conference was not completed. The Medical Doctor is in agreement with Psychiatrist disposition because of (reason) patient is requesting voluntary admission. The plan is admit to appropriate local bed. The on-call Psychiatrist consulted was Dr. Halie Russell. The admitting Psychiatrist will be Dr. Halie Russell. The admitting Diagnosis is Substance Induced Mood Disorder. The Payor source is [employer]. Section II - Integrated Summary Summary:  Patient is a 76year old male seen face to face in the ER. He came to the ER reporting suicidal ideation with a plan to  off a bridge. He also told this clinician he would \"jab myself in the heart,\" but did not specify with what device. He reported he has been off psychiatric medications for about a year after Dr. Sneha Burch stopped seeing outpatient clients at the clinic he went to. He has received no outpatient mental health or substance abuse treatment since then. He has a long history of alcoholism since age 14. He reported he is drinking daily but his only withdrawal symptoms are slight tremors. He reported he has had a 5 year period of sobriety in the past \"a few years back. \"  He reported being an alcoholic is making him depressed. His cat is also sick. He denied homicidal ideation and symptoms of psychosis. He reported he has been taking his wife's Trazodone to sleep which helps. His appetite comes and goes. He has multiple past psychiatric admissions, but has not been admitted to a [facility] facility since he was at [facility] in September 2012.   He reported his most recent admission was at [facility] in late 2019 or early 2020 before COVID lockdowns. He is requesting voluntary admission. The patient has demonstrated mental capacity to provide informed consent. The information is given by the patient and past medical records. The Chief Complaint is mental health problem. The Precipitant Factors are treatment noncompliance, chronic substance abuse. Previous Hospitalizations: yes The patient has not previously been in restraints. Current Psychiatrist and/or  is NA. Lethality Assessment: 
 
The potential for suicide is noted by the following: defined plan, ideation and current substance abuse . The potential for homicide is not noted. The patient has not been a perpetrator of sexual or physical abuse. There are not pending charges. The patient is felt to be at risk for self harm or harm to others. The attending nurse was advised the patient needs supervision. Section III - Psychosocial 
The patient's overall mood and attitude is withdrawn. Feelings of helplessness and hopelessness are not observed. Generalized anxiety is not observed. Panic is not observed. Phobias are not observed. Obsessive compulsive tendencies are not observed. Section IV - Mental Status Exam 
The patient's appearance is unkempt. The patient's behavior shows no evidence of impairment. The patient is oriented to time, place, person and situation. The patient's speech shows no evidence of impairment. The patient's mood is withdrawn. The range of affect is constricted. The patient's thought content demonstrates no evidence of impairment. The thought process shows no evidence of impairment. The patient's perception shows no evidence of impairment. The patient's memory shows no evidence of impairment. The patient's appetite is decreased. The patient's sleep shows no evidence of impairment. The patient's insight is blaming. The patient's judgement is psychologically impaired. Section V - Substance Abuse The patient is using substances. The patient is using alcohol for greater than 10 years with last use on today. The patient has experienced the following withdrawal symptoms: tremors and cravings. Section VI - Living Arrangements The patient is . The patient lives with a spouse. The patient has no children. The patient does plan to return home upon discharge. The patient does not have legal issues pending. The patient's source of income comes from social security. Church and cultural practices have not been voiced at this time. The patient's greatest support comes from his wife and this person will not be involved with the treatment. The patient has not been in an event described as horrible or outside the realm of ordinary life experience either currently or in the past. 
The patient has not been a victim of sexual/physical abuse. Section VII - Other Areas of Clinical Concern The highest grade achieved is not assessed with the overall quality of school experience being described as unknown. The patient is currently disabled and speaks [place] as a primary language. The patient has no communication impairments affecting communication. The patient's preference for learning can be described as: can read and write adequately.   The patient's hearing is normal.  The patient's vision is normal. 
 
 
Deidre eFrnandez MA

## 2021-05-24 PROBLEM — F39 MOOD DISORDER (HCC): Status: ACTIVE | Noted: 2021-05-24

## 2021-05-24 PROCEDURE — 74011250637 HC RX REV CODE- 250/637: Performed by: NURSE PRACTITIONER

## 2021-05-24 PROCEDURE — 65220000003 HC RM SEMIPRIVATE PSYCH

## 2021-05-24 PROCEDURE — 74011250637 HC RX REV CODE- 250/637: Performed by: PSYCHIATRY & NEUROLOGY

## 2021-05-24 RX ORDER — IBUPROFEN 400 MG/1
400 TABLET ORAL
Status: DISCONTINUED | OUTPATIENT
Start: 2021-05-24 | End: 2021-05-28 | Stop reason: HOSPADM

## 2021-05-24 RX ORDER — ATORVASTATIN CALCIUM 40 MG/1
40 TABLET, FILM COATED ORAL DAILY
Status: ON HOLD | COMMUNITY
End: 2021-05-28 | Stop reason: SDUPTHER

## 2021-05-24 RX ORDER — OLANZAPINE 2.5 MG/1
2.5 TABLET ORAL
COMMUNITY
End: 2021-05-28

## 2021-05-24 RX ORDER — PERPHENAZINE 4 MG/1
8 TABLET, FILM COATED ORAL DAILY
Status: DISCONTINUED | OUTPATIENT
Start: 2021-05-25 | End: 2021-05-25

## 2021-05-24 RX ORDER — PHENOBARBITAL 32.4 MG/1
16.2 TABLET ORAL 2 TIMES DAILY
Status: COMPLETED | OUTPATIENT
Start: 2021-05-27 | End: 2021-05-28

## 2021-05-24 RX ORDER — DICYCLOMINE HYDROCHLORIDE 10 MG/ML
20 INJECTION INTRAMUSCULAR
Status: DISCONTINUED | OUTPATIENT
Start: 2021-05-24 | End: 2021-05-28 | Stop reason: HOSPADM

## 2021-05-24 RX ORDER — PHENOBARBITAL 32.4 MG/1
32.4 TABLET ORAL 4 TIMES DAILY
Status: COMPLETED | OUTPATIENT
Start: 2021-05-25 | End: 2021-05-26

## 2021-05-24 RX ORDER — PHENOBARBITAL 32.4 MG/1
32.4 TABLET ORAL ONCE
Status: COMPLETED | OUTPATIENT
Start: 2021-05-24 | End: 2021-05-24

## 2021-05-24 RX ORDER — OLANZAPINE 2.5 MG/1
2.5 TABLET ORAL
Status: DISCONTINUED | OUTPATIENT
Start: 2021-05-25 | End: 2021-05-25

## 2021-05-24 RX ORDER — MELATONIN
5000 DAILY
Status: DISCONTINUED | OUTPATIENT
Start: 2021-05-25 | End: 2021-05-28 | Stop reason: HOSPADM

## 2021-05-24 RX ORDER — ASPIRIN 81 MG/1
81 TABLET ORAL DAILY
Status: DISCONTINUED | OUTPATIENT
Start: 2021-05-25 | End: 2021-05-28 | Stop reason: HOSPADM

## 2021-05-24 RX ORDER — LOPERAMIDE HYDROCHLORIDE 2 MG/1
2 CAPSULE ORAL AS NEEDED
Status: DISCONTINUED | OUTPATIENT
Start: 2021-05-24 | End: 2021-05-28 | Stop reason: HOSPADM

## 2021-05-24 RX ORDER — ADHESIVE BANDAGE
30 BANDAGE TOPICAL DAILY PRN
Status: DISCONTINUED | OUTPATIENT
Start: 2021-05-24 | End: 2021-05-26 | Stop reason: SDUPTHER

## 2021-05-24 RX ORDER — THERA TABS 400 MCG
1 TAB ORAL DAILY
Status: DISCONTINUED | OUTPATIENT
Start: 2021-05-25 | End: 2021-05-28 | Stop reason: HOSPADM

## 2021-05-24 RX ORDER — SERTRALINE HYDROCHLORIDE 50 MG/1
200 TABLET, FILM COATED ORAL DAILY
Status: DISCONTINUED | OUTPATIENT
Start: 2021-05-25 | End: 2021-05-25

## 2021-05-24 RX ORDER — PHENOBARBITAL 32.4 MG/1
64.8 TABLET ORAL
Status: ACTIVE | OUTPATIENT
Start: 2021-05-24 | End: 2021-05-25

## 2021-05-24 RX ORDER — CLOPIDOGREL BISULFATE 75 MG/1
75 TABLET ORAL DAILY
Status: DISCONTINUED | OUTPATIENT
Start: 2021-05-25 | End: 2021-05-28 | Stop reason: HOSPADM

## 2021-05-24 RX ORDER — ASPIRIN 81 MG/1
81 TABLET ORAL DAILY
Status: ON HOLD | COMMUNITY
End: 2021-05-28 | Stop reason: SDUPTHER

## 2021-05-24 RX ORDER — PHENOBARBITAL 32.4 MG/1
64.8 TABLET ORAL 4 TIMES DAILY
Status: DISPENSED | OUTPATIENT
Start: 2021-05-24 | End: 2021-05-25

## 2021-05-24 RX ORDER — CLONIDINE HYDROCHLORIDE 0.1 MG/1
0.1 TABLET ORAL
Status: DISCONTINUED | OUTPATIENT
Start: 2021-05-24 | End: 2021-05-28 | Stop reason: HOSPADM

## 2021-05-24 RX ORDER — PHENOBARBITAL 32.4 MG/1
16.2 TABLET ORAL
Status: DISCONTINUED | OUTPATIENT
Start: 2021-05-27 | End: 2021-05-28 | Stop reason: HOSPADM

## 2021-05-24 RX ORDER — PANTOPRAZOLE SODIUM 40 MG/1
40 TABLET, DELAYED RELEASE ORAL DAILY
Status: DISCONTINUED | OUTPATIENT
Start: 2021-05-25 | End: 2021-05-28 | Stop reason: HOSPADM

## 2021-05-24 RX ORDER — PERPHENAZINE 8 MG/1
8 TABLET, FILM COATED ORAL DAILY
COMMUNITY
End: 2021-05-28

## 2021-05-24 RX ORDER — LISINOPRIL 20 MG/1
40 TABLET ORAL DAILY
Status: DISCONTINUED | OUTPATIENT
Start: 2021-05-25 | End: 2021-05-28 | Stop reason: HOSPADM

## 2021-05-24 RX ORDER — FOLIC ACID 1 MG/1
1 TABLET ORAL DAILY
Status: DISCONTINUED | OUTPATIENT
Start: 2021-05-25 | End: 2021-05-28 | Stop reason: HOSPADM

## 2021-05-24 RX ORDER — METFORMIN HYDROCHLORIDE 500 MG/1
1000 TABLET ORAL 2 TIMES DAILY WITH MEALS
Status: DISCONTINUED | OUTPATIENT
Start: 2021-05-25 | End: 2021-05-28 | Stop reason: HOSPADM

## 2021-05-24 RX ORDER — TRAZODONE HYDROCHLORIDE 100 MG/1
100 TABLET ORAL
Status: DISCONTINUED | OUTPATIENT
Start: 2021-05-25 | End: 2021-05-28 | Stop reason: HOSPADM

## 2021-05-24 RX ORDER — PHENOBARBITAL 32.4 MG/1
32.4 TABLET ORAL 2 TIMES DAILY
Status: COMPLETED | OUTPATIENT
Start: 2021-05-26 | End: 2021-05-27

## 2021-05-24 RX ORDER — PHENOBARBITAL 32.4 MG/1
32.4 TABLET ORAL
Status: ACTIVE | OUTPATIENT
Start: 2021-05-25 | End: 2021-05-27

## 2021-05-24 RX ORDER — ATORVASTATIN CALCIUM 40 MG/1
40 TABLET, FILM COATED ORAL DAILY
Status: DISCONTINUED | OUTPATIENT
Start: 2021-05-25 | End: 2021-05-28 | Stop reason: HOSPADM

## 2021-05-24 RX ORDER — LANOLIN ALCOHOL/MO/W.PET/CERES
100 CREAM (GRAM) TOPICAL DAILY
Status: DISCONTINUED | OUTPATIENT
Start: 2021-05-25 | End: 2021-05-28 | Stop reason: HOSPADM

## 2021-05-24 RX ADMIN — PHENOBARBITAL 32.4 MG: 32.4 TABLET ORAL at 21:52

## 2021-05-24 RX ADMIN — CLONIDINE HYDROCHLORIDE 0.1 MG: 0.1 TABLET ORAL at 20:19

## 2021-05-24 RX ADMIN — PHENOBARBITAL 64.8 MG: 32.4 TABLET ORAL at 17:28

## 2021-05-24 NOTE — PROGRESS NOTES
Behavioral Services  Medicare Certification Upon Admission    I certify that this patient's inpatient psychiatric hospital admission is medically necessary for:    [x] (1) Treatment which could reasonably be expected to improve this patient's condition,       [x] (2) Or for diagnostic study;     AND     [x](2) The inpatient psychiatric services are provided while the individual is under the care of a physician and are included in the individualized plan of care.     Estimated length of stay/service 5 - 7 days    Plan for post-hospital care Per social work    Electronically signed by Pino Meraz MD on 5/24/2021 at 5:06 PM

## 2021-05-24 NOTE — PROGRESS NOTES
1400:  Report obtained from Odilia Earl in [facility] ED at 1400. Pt arrived from the [facility] ED  in [facility] to the services of Dr. Anuradha Jacob at 1600. Pt is ambulatory. Admission completed by Edita Santo RN, assisted by Joanne Hall for the skin assessment, height and weight, and admission assessment. Pt has a history of high blood pressure, non insulin dependent type 2 diabetes, and alcohol substance use disorder. Pt reports a history of substance abuse and multiple psychiatric admissions in the past. Most recent being [facility] about 1 year ago. PT denies SI/HI/AH/VH. Recent stressors include lock downs and lack of social structures related to covid lockdowns. Pt is calm and cooperative upon admission. Pt is AOx4. Alyson Herrera RN obtained admission orders obtained from Dr. Anuradha Jacob. 1600 : CIWA - 6. Phenobarb administered by RN for withdrawal symptoms.

## 2021-05-24 NOTE — ED NOTES
Hourly rounding completed on this pt. Offered assistance for toileting or hygiene at this time. Provided opportunity for snack nourishment or PO fluid hydration. Pt is up-to-date on plan of care. No pain interventions required at this time. Warm blanket offered, call bell within reach, safety precautions in place, bed locked and in the lowest position. Pt in bed with eyes closed, arouses easily when this RN enters room.

## 2021-05-24 NOTE — ED NOTES
Hourly rounding completed on this pt. Offered assistance for toileting or hygiene at this time. Provided opportunity for snack nourishment or PO fluid hydration. Pt is up-to-date on plan of care. No pain interventions required at this time. Warm blanket offered, call bell within reach, safety precautions in place, bed locked and in the lowest position. Patient ambulated independently to restroom, steady gait noted. x1 occurrence of urine.

## 2021-05-24 NOTE — ED NOTES
Hourly rounding completed on this pt. Offered assistance for toileting or hygiene at this time. Provided opportunity for snack nourishment or PO fluid hydration. Pt is up-to-date on plan of care. No pain interventions required at this time. Warm blanket provided, call bell within reach, safety precautions in place, bed locked and in the lowest position. Pt watching television, calm and cooperative, remains alert and answers questions appropriately. Pt denies any complaints and appears in no distress at this time.

## 2021-05-24 NOTE — ED NOTES
Hourly rounding completed on this pt. Offered assistance for toileting or hygiene at this time. Provided breakfast tray. Pt is up-to-date on plan of care. No pain interventions required at this time. Warm blanket offered, call bell within reach, safety precautions in place, bed locked and in the lowest position.

## 2021-05-24 NOTE — ED PROVIDER NOTES
EMERGENCY DEPARTMENT HISTORY AND PHYSICAL EXAM    Date: 5/23/2021  Patient Name: Leyla Deal    History of Presenting Illness     Chief Complaint   Patient presents with    Mental Health Problem     depression with SI denies having a plan- States his alcoholism is what is making him depressed. History Provided By: Patient    Chief Complaint: mental health problem  Duration chronic worse past few days  Timing:  Progressive worsening  Quality: thoughts of self harm  Severity: Severe  Modifying Factors: drinking alcohol worsens symptoms  Associated Symptoms: not taking psych meds for a few m onths      HPI: Leyla Deal is a 76 y.o. male with a PMH of Diabetes hypertension alcohol abuse bipolar disorder who presents with complaint of thoughts of self-harm. Patient states he has been drinking heavily daily and states he drinks 1/5 of whiskey and a case of beer a day. Patient states he has been drinking for a long time. Patient also states he has a history of of depression and has not taking his psychotropic medications for the past few months. Patient states he is now having thoughts of jumping off a bridge. Patient states he has been hospitalized in the past, states his psychiatrist is Dr. Anuradha Jacob.     PCP: Roger Matthews NP    Current Facility-Administered Medications   Medication Dose Route Frequency Provider Last Rate Last Admin    nicotine (NICODERM CQ) 14 mg/24 hr patch 1 Patch  1 Patch TransDERmal DAILY Amarilis García MD   1 Patch at 05/23/21 2007     Current Outpatient Medications   Medication Sig Dispense Refill    atorvastatin (LIPITOR) 80 mg tablet TAKE 1 TABLET BY MOUTH EVERY DAY  3    clopidogrel (PLAVIX) 75 mg tab TAKE ONE TABLET BY MOUTH DAILY  0    glipiZIDE (GLUCOTROL) 10 mg tablet TAKE 1 TABLET BY ORAL ROUTE 2 TIMES EVERY DAY BEFORE A MEAL FOR E11.69  1    lisinopril (PRINIVIL, ZESTRIL) 40 mg tablet TAKE 1 TABLET BY MOUTH EVERY DAY  3    metFORMIN (GLUCOPHAGE) 1,000 mg tablet TAKE 1 TABLET BY MOUTH TWICE A DAY  3    pantoprazole (PROTONIX) 40 mg tablet TAKE 1 TABLET BY MOUTH EVERY DAY  3    sertraline (ZOLOFT) 100 mg tablet TAKE 1 TABLET BY MOUTH EVERY DAY  3    traZODone (DESYREL) 100 mg tablet TAKE 1 TABLET BY MOUTH EVERY DAY AT BEDTIME  3    cholecalciferol, VITAMIN D3, (VITAMIN D3) 5,000 unit tab tablet Take  by mouth daily.  perphenazine (TRILAFON) 4 mg tablet TAKE 1 TABLET BY MOUTH AT BEDTIME  0    Blood-Glucose Meter monitoring kit Use twice daily to check blood sugars 1 Kit 0    Lancets misc Use twice daily to check blood sugars 1 Each 11    glucose blood VI test strips (ASCENSIA AUTODISC VI, ONE TOUCH ULTRA TEST VI) strip Use twice daily to check blood sugars 100 Strip 11       Past History     Past Medical History:  Past Medical History:   Diagnosis Date    [facility] ([facility]     Depression     DM (diabetes mellitus) ([facility]     ETOH abuse     HTN (hypertension)     [facility]        Past Surgical History:  Past Surgical History:   Procedure Laterality Date    HX HERNIA REPAIR         Family History:  History reviewed. No pertinent family history. Social History:  Social History     Tobacco Use    Smoking status: Current Every Day Smoker     Packs/day: 1.00    Smokeless tobacco: Never Used   Substance Use Topics    Alcohol use: Yes     Comment: drinks a fifth of wehiskey and a case a beer per day    Drug use: No       Allergies: Allergies   Allergen Reactions    Egg Swelling         Review of Systems   Review of Systems   Constitutional: Negative for chills, fatigue and fever. Eyes: Negative for redness. Respiratory: Negative for cough and chest tightness. Cardiovascular: Negative for chest pain. Gastrointestinal: Negative for abdominal pain. Genitourinary: Negative for dysuria. Musculoskeletal: Negative for arthralgias and back pain. Skin: Negative for rash. Neurological: Negative for weakness.    Psychiatric/Behavioral: Positive for suicidal ideas. All other systems reviewed and are negative. Physical Exam     Vitals:    05/23/21 1650 05/23/21 2146   BP: (!) 144/81 (!) 160/76   Pulse: 84 78   Resp: 20 20   Temp: 98.6 °F (37 °C) 98 °F (36.7 °C)   SpO2: 95% 92%   Weight: 110.7 kg (244 lb)    Height: 6' (1.829 m)      Physical Exam  Vitals and nursing note reviewed. Constitutional:       Appearance: He is well-developed. HENT:      Head: Normocephalic and atraumatic. Right Ear: External ear normal.   Eyes:      General:         Right eye: No discharge. Left eye: No discharge. Conjunctiva/sclera: Conjunctivae normal.   Cardiovascular:      Rate and Rhythm: Normal rate and regular rhythm. Heart sounds: Normal heart sounds. Pulmonary:      Effort: Pulmonary effort is normal. No respiratory distress. Breath sounds: Normal breath sounds. No wheezing. Abdominal:      General: Bowel sounds are normal.      Palpations: Abdomen is soft. Tenderness: There is no abdominal tenderness. Musculoskeletal:         General: Normal range of motion. Cervical back: Normal range of motion and neck supple. Lymphadenopathy:      Cervical: No cervical adenopathy. Skin:     General: Skin is warm and dry. Neurological:      Mental Status: He is alert and oriented to person, place, and time. Cranial Nerves: No cranial nerve deficit.    Psychiatric:      Comments: Depressed mood           Diagnostic Study Results     Labs -     Recent Results (from the past 12 hour(s))   DRUG SCREEN, URINE    Collection Time: 05/23/21  6:13 PM   Result Value Ref Range    AMPHETAMINES Negative NEG      BARBITURATES Negative NEG      BENZODIAZEPINES Negative NEG      COCAINE Negative NEG      METHADONE Negative NEG      OPIATES Negative NEG      PCP(PHENCYCLIDINE) Negative NEG      THC (TH-CANNABINOL) Negative NEG      Drug screen comment (NOTE)    CBC WITH AUTOMATED DIFF    Collection Time: 05/23/21  6:15 PM   Result Value Ref Range    WBC 4.7 4.1 - 11.1 K/uL    RBC 4.41 4.10 - 5.70 M/uL    HGB 14.1 12.1 - 17.0 g/dL    HCT 40.0 36.6 - 50.3 %    MCV 90.7 80.0 - 99.0 FL    MCH 32.0 26.0 - 34.0 PG    MCHC 35.3 30.0 - 36.5 g/dL    RDW 12.6 11.5 - 14.5 %    PLATELET 263 431 - 528 K/uL    MPV 10.4 8.9 - 12.9 FL    NRBC 0.0 0  WBC    ABSOLUTE NRBC 0.00 0.00 - 0.01 K/uL    NEUTROPHILS 63 32 - 75 %    LYMPHOCYTES 25 12 - 49 %    MONOCYTES 7 5 - 13 %    EOSINOPHILS 3 0 - 7 %    BASOPHILS 2 (H) 0 - 1 %    IMMATURE GRANULOCYTES 0 0.0 - 0.5 %    ABS. NEUTROPHILS 3.0 1.8 - 8.0 K/UL    ABS. LYMPHOCYTES 1.2 0.8 - 3.5 K/UL    ABS. MONOCYTES 0.3 0.0 - 1.0 K/UL    ABS. EOSINOPHILS 0.1 0.0 - 0.4 K/UL    ABS. BASOPHILS 0.1 0.0 - 0.1 K/UL    ABS. IMM. GRANS. 0.0 0.00 - 0.04 K/UL    DF AUTOMATED     METABOLIC PANEL, COMPREHENSIVE    Collection Time: 05/23/21  6:15 PM   Result Value Ref Range    Sodium 136 136 - 145 mmol/L    Potassium 3.9 3.5 - 5.1 mmol/L    Chloride 98 97 - 108 mmol/L    CO2 26 21 - 32 mmol/L    Anion gap 12 5 - 15 mmol/L    Glucose 185 (H) 65 - 100 mg/dL    BUN 8 6 - 20 MG/DL    Creatinine 0.93 0.70 - 1.30 MG/DL    BUN/Creatinine ratio 9 (L) 12 - 20      GFR est AA >60 >60 ml/min/1.73m2    GFR est non-AA >60 >60 ml/min/1.73m2    Calcium 9.1 8.5 - 10.1 MG/DL    Bilirubin, total 0.3 0.2 - 1.0 MG/DL    ALT (SGPT) 68 12 - 78 U/L    AST (SGOT) 36 15 - 37 U/L    Alk.  phosphatase 59 45 - 117 U/L    Protein, total 7.5 6.4 - 8.2 g/dL    Albumin 4.1 3.5 - 5.0 g/dL    Globulin 3.4 2.0 - 4.0 g/dL    A-G Ratio 1.2 1.1 - 2.2     ETHYL ALCOHOL    Collection Time: 05/23/21  6:15 PM   Result Value Ref Range    ALCOHOL(ETHYL),SERUM 154 (H) <10 MG/DL   COVID-19 WITH INFLUENZA A/B    Collection Time: 05/23/21  7:25 PM   Result Value Ref Range    SARS-CoV-2 Not detected NOTD      Influenza A by PCR Not detected NOTD      Influenza B by PCR Not detected NOTD         Radiologic Studies -   No orders to display     CT Results  (Last 48 hours) None        CXR Results  (Last 48 hours)    None            Medical Decision Making   I am the first provider for this patient. I reviewed the vital signs, available nursing notes, past medical history, past surgical history, family history and social history. Vital Signs-Reviewed the patient's vital signs. Records Reviewed: Nursing Notes    Provider Notes (Medical Decision Making):   48-year-old voluntary admission to the emergency department for depression and alcohol abuse. Will order labs be smart consult. Bipolar depression major depressive disorder alcohol abuse substance-induced mood disorder  ED Course as of May 24 1714   Sun May 23, 2021   1936 Patient has been evaluated by Nasrin Donato counselor. Patient to be admitted    [AN]   2016 Patient is medically cleared    [AN]   2231 Case sign out to attending.     [AN]   Mon May 24, 2021   1344 Patient has been accepted to [facility] inpatient psychiatry. [MS]      ED Course User Index  [AN] Teagan Andrea NP  [MS] Madhu Koch MD          Procedures:  Procedures    Please note that this dictation was completed with Dragon, computer voice recognition software. Quite often unanticipated grammatical, syntax, homophones, and other interpretive errors are inadvertently transcribed by the computer software. Please disregard these errors. Additionally, please excuse any errors that have escaped final proofreading. Diagnosis     Clinical Impression:   1. Substance induced mood disorder ([facility]    2.  Alcohol abuse

## 2021-05-24 NOTE — GROUP NOTE
SOHAIL  GROUP DOCUMENTATION INDIVIDUAL Group Therapy Note Date: 5/24/2021 Group Start Time: 1400 Group End Time: 1500 Group Topic: Recreational/Music Therapy [facility] [place] [address] ACUTE BEHAV [facility] [place], [address] GROUP DOCUMENTATION GROUP Group Therapy Note Attendees: 8 Attendance: Did not attend Patient's Goal: Interventions/techniquesNoemi Mann

## 2021-05-24 NOTE — ED NOTES
Patient also requesting his nightly dose of 100mg Trazodone PO, informed SHAHBAZ London. Lita stated she will talk with patient.

## 2021-05-24 NOTE — PROGRESS NOTES
Problem: Falls - Risk of  Goal: *Absence of Falls  Description: Document Cathleen Sudeepvashti Fall Risk and appropriate interventions in the flowsheet.   Outcome: Progressing Towards Goal  Note: Fall Risk Interventions: Clear

## 2021-05-24 NOTE — BH NOTES
GROUP THERAPY PROGRESS NOTE    Patient did not participate in Coping Skills group. Patient was completing admission paperwork with other staff during group session time.     SERG Raza

## 2021-05-24 NOTE — ED NOTES
Patient given Ativan per request to help with sleep. Patient is pleasant, calm and cooperative. Patient laying in bed with lights dimmed, no signs of acute distress noted. Water within reach, safety and suicide precautions in place. Will continue to monitor.

## 2021-05-24 NOTE — ED NOTES
Pt up to bathroom, alert, answers questions appropriately, pleasant and cooperative. Pt offered snack and po fluids. Pt declined. Pt offered warm blankets. Pt updated on plan of care, waiting on bed assignment upstairs.

## 2021-05-24 NOTE — ED NOTES
Patient presents to ED with c/o depression with SI denies having a plan- States his alcoholism is what is making him depressed. Patient states that he drink 1/5 of whiskey and a case of beer a day. Patient is alert and oriented x 4 and in no acute distress at this time. Respirations are at a regular rate, depth, and pattern. Patient updated on plan of care and has no questions or concerns at this time. Call bell within reach. Will continue to monitor. Please reference nursing assessment. Emergency Department Nursing Plan of Care       The Nursing Plan of Care is developed from the Nursing assessment and Emergency Department Attending provider initial evaluation. The plan of care may be reviewed in the ED Provider note.     The Plan of Care was developed with the following considerations:   Patient / Family readiness to learn indicated by:verbalized understanding and successful return demonstration  Persons(s) to be included in education: patient  Barriers to Learning/Limitations:Cognitive/physical impairment:    Signed     Rhys Lewis RN    5/23/2021  5:45 PM

## 2021-05-24 NOTE — ED NOTES
Hourly rounding completed on this pt. Offered assistance for toileting or hygiene at this time. Pt up to bathroom, ambulates with steady gait. Provided snack nourishment and PO fluid hydration. Pt is up-to-date on plan of care. No pain interventions required at this time. Warm blanket offered, call bell within reach, safety precautions in place, bed locked and in the lowest position.

## 2021-05-24 NOTE — ED NOTES
Patient changed into paper scrubs and hospital socks. Patient belongings secured to include cigarettes and lighter and labeled in one bag, placed in black locker on unit. Patient calm and cooperative, requesting nicotine patch. Patient laying in bed with lights dimmed per request. Safety measures in place. Will continue to monitor.

## 2021-05-24 NOTE — ED NOTES
TRANSFER - OUT REPORT:    Verbal report given to Humberto(name) otto Manzo Patella  being transferred to U(unit) for routine progression of care       Report consisted of patients Situation, Background, Assessment and   Recommendations(SBAR). Information from the following report(s) SBAR, Kardex, ED Summary and Recent Results was reviewed with the receiving nurse. Patient transported with belongings to [facility] via security.

## 2021-05-24 NOTE — PROGRESS NOTES
[facility] Admission Pharmacy Medication Reconciliation     Information obtained from: Patient's wife (verbal permission from patient), [facility] pharmacy  RxQuery data available1: No    Comments/recommendations:  Patient is a poor historian regarding medications and gave verbal permission to speak with his wife regarding current medications. Patient's wife read through medication bottles with writer. She reports he is compliant with medications but medication fill history does not support compliance (see PTA medication list for specific fill dates). Patient is supposed to be taking insulin but wife reports he has not taken in several months. He was switched to a different insulin by his primary care provider that he could not afford. He did not follow-up with his PCP afterwards. Previously prescribed Novolin 70/30 16 units BID with meals, per wife. Confirmed outpatient pharmacy with patient's wife. Medication changes (since last review): Added  Aspirin  Removed  Glipizide  Diabetes testing supplies  Adjusted  Atorvastatin dose (however has not been filled since August 2019, wife reports it is current)  Sertraline dose adjusted from 100 mg daily to 200 mg daily  Perphenazine dose adjusted from 4 mg/day to 8 mg/day      1RxQuery pharmacy benefit data reflects medications filled and processed through the patient's insurance, however                this data does NOT capture whether the medication was picked up or is currently being taken by the patient. Total Time Spent: 30 minutes    Past Medical History/Disease States:  Past Medical History:   Diagnosis Date    Bipolar 2 disorder ([facility]     Depression     DM (diabetes mellitus) ([facility]     ETOH abuse     HTN (hypertension)     Stroke [facility])          Patient allergies:    Allergies as of 05/23/2021 - Fully Reviewed 05/23/2021   Allergen Reaction Noted    Egg Swelling 09/05/2012         Prior to Admission Medications   Prescriptions Last Dose Informant Patient Reported? Taking? OLANZapine (ZyPREXA) 2.5 mg tablet Unknown at Unknown time  Yes No   Sig: Take 2.5 mg by mouth nightly. aspirin delayed-release 81 mg tablet   Yes Yes   Sig: Take 81 mg by mouth daily. Indications: prevention of transient ischemic attack   atorvastatin (LIPITOR) 40 mg tablet Unknown at Unknown time  Yes No   Sig: Take 40 mg by mouth daily. cholecalciferol, VITAMIN D3, (VITAMIN D3) 5,000 unit tab tablet   Yes Yes   Sig: Take 5,000 Units by mouth daily. Indications: prevention of vitamin D deficiency   clopidogrel (PLAVIX) 75 mg tab   Yes Yes   Sig: Take 75 mg by mouth daily. Indications: prevention for a blood clot going to the brain   lisinopril (PRINIVIL, ZESTRIL) 40 mg tablet Unknown at Unknown time  Yes No   Sig: Take 40 mg by mouth daily. Indications: high blood pressure   metFORMIN (GLUCOPHAGE) 1,000 mg tablet   Yes Yes   Sig: Take 1,000 mg by mouth two (2) times daily (with meals). Indications: type 2 diabetes mellitus   pantoprazole (PROTONIX) 40 mg tablet   Yes Yes   Sig: Take 40 mg by mouth daily. Indications: gastroesophageal reflux disease   perphenazine (TRILAFON) 8 mg tablet   Yes Yes   Sig: Take 8 mg by mouth daily. Indications: bipolar 2 disorder   sertraline (ZOLOFT) 100 mg tablet   Yes Yes   Sig: Take 200 mg by mouth daily. Indications: major depressive disorder   traZODone (DESYREL) 100 mg tablet   Yes Yes   Sig: Take 100 mg by mouth nightly.  Indications: insomnia      Facility-Administered Medications: None          Thank you,  [facility], [address] [place] [address] Nw: [phone] ([id number])  Pharmacy: [phone] ([id number])

## 2021-05-24 NOTE — ED NOTES
Bed board reports that they are still working on the bed search and waiting to hear back from the doctor.

## 2021-05-24 NOTE — ED NOTES
Bedside shift change report given to Noble Abrams RN (oncoming nurse) by Lavon Colbert RN (offgoing nurse). Report included the following information SBAR, ED Summary, Intake/Output, MAR and Recent Results.

## 2021-05-24 NOTE — BSMART NOTE
Patient has been accepted to [facility] bed 1 by Dr Sneha Burch. Room is being cleaned. Report can be called in about 15 minutes.

## 2021-05-25 PROBLEM — F10.20 ALCOHOL USE DISORDER, MODERATE, DEPENDENCE (HCC): Status: ACTIVE | Noted: 2021-05-25

## 2021-05-25 PROBLEM — F32.A DEPRESSION: Status: RESOLVED | Noted: 2017-08-08 | Resolved: 2021-05-25

## 2021-05-25 PROBLEM — F25.1 SCHIZOAFFECTIVE DISORDER, DEPRESSIVE TYPE (HCC): Status: ACTIVE | Noted: 2017-08-08

## 2021-05-25 PROBLEM — F43.21 ADJUSTMENT DISORDER WITH DEPRESSED MOOD: Status: ACTIVE | Noted: 2021-05-25

## 2021-05-25 PROBLEM — F39 MOOD DISORDER (HCC): Status: RESOLVED | Noted: 2021-05-24 | Resolved: 2021-05-25

## 2021-05-25 LAB
GLUCOSE BLD STRIP.AUTO-MCNC: 286 MG/DL (ref 65–117)
SERVICE CMNT-IMP: ABNORMAL

## 2021-05-25 PROCEDURE — 74011250637 HC RX REV CODE- 250/637: Performed by: PSYCHIATRY & NEUROLOGY

## 2021-05-25 PROCEDURE — 65220000003 HC RM SEMIPRIVATE PSYCH

## 2021-05-25 PROCEDURE — 74011250637 HC RX REV CODE- 250/637: Performed by: EMERGENCY MEDICINE

## 2021-05-25 PROCEDURE — 99223 1ST HOSP IP/OBS HIGH 75: CPT | Performed by: PSYCHIATRY & NEUROLOGY

## 2021-05-25 PROCEDURE — 82962 GLUCOSE BLOOD TEST: CPT

## 2021-05-25 RX ORDER — ACETAMINOPHEN 325 MG/1
650 TABLET ORAL
Status: DISCONTINUED | OUTPATIENT
Start: 2021-05-25 | End: 2021-05-28 | Stop reason: HOSPADM

## 2021-05-25 RX ORDER — MAGNESIUM SULFATE 100 %
4 CRYSTALS MISCELLANEOUS AS NEEDED
Status: DISCONTINUED | OUTPATIENT
Start: 2021-05-25 | End: 2021-05-25

## 2021-05-25 RX ORDER — ADHESIVE BANDAGE
30 BANDAGE TOPICAL DAILY PRN
Status: DISCONTINUED | OUTPATIENT
Start: 2021-05-25 | End: 2021-05-28 | Stop reason: HOSPADM

## 2021-05-25 RX ORDER — SERTRALINE HYDROCHLORIDE 50 MG/1
50 TABLET, FILM COATED ORAL DAILY
Status: DISCONTINUED | OUTPATIENT
Start: 2021-05-26 | End: 2021-05-25

## 2021-05-25 RX ORDER — PERPHENAZINE 4 MG/1
4 TABLET, FILM COATED ORAL DAILY
Status: DISCONTINUED | OUTPATIENT
Start: 2021-05-26 | End: 2021-05-28 | Stop reason: HOSPADM

## 2021-05-25 RX ORDER — DEXTROSE 50 % IN WATER (D50W) INTRAVENOUS SYRINGE
25-50 AS NEEDED
Status: DISCONTINUED | OUTPATIENT
Start: 2021-05-25 | End: 2021-05-25

## 2021-05-25 RX ORDER — OLANZAPINE 2.5 MG/1
2.5 TABLET ORAL
Status: DISCONTINUED | OUTPATIENT
Start: 2021-05-25 | End: 2021-05-28 | Stop reason: HOSPADM

## 2021-05-25 RX ORDER — DIPHENHYDRAMINE HYDROCHLORIDE 50 MG/ML
25 INJECTION, SOLUTION INTRAMUSCULAR; INTRAVENOUS
Status: DISCONTINUED | OUTPATIENT
Start: 2021-05-25 | End: 2021-05-28 | Stop reason: HOSPADM

## 2021-05-25 RX ORDER — BENZTROPINE MESYLATE 1 MG/1
0.5 TABLET ORAL
Status: DISCONTINUED | OUTPATIENT
Start: 2021-05-25 | End: 2021-05-28 | Stop reason: HOSPADM

## 2021-05-25 RX ORDER — INSULIN LISPRO 100 [IU]/ML
INJECTION, SOLUTION INTRAVENOUS; SUBCUTANEOUS
Status: DISCONTINUED | OUTPATIENT
Start: 2021-05-26 | End: 2021-05-28 | Stop reason: HOSPADM

## 2021-05-25 RX ORDER — SERTRALINE HYDROCHLORIDE 50 MG/1
200 TABLET, FILM COATED ORAL DAILY
Status: DISCONTINUED | OUTPATIENT
Start: 2021-05-26 | End: 2021-05-28 | Stop reason: HOSPADM

## 2021-05-25 RX ORDER — HALOPERIDOL 5 MG/ML
2.5 INJECTION INTRAMUSCULAR
Status: DISCONTINUED | OUTPATIENT
Start: 2021-05-25 | End: 2021-05-28 | Stop reason: HOSPADM

## 2021-05-25 RX ORDER — MAGNESIUM SULFATE 100 %
4 CRYSTALS MISCELLANEOUS AS NEEDED
Status: DISCONTINUED | OUTPATIENT
Start: 2021-05-25 | End: 2021-05-28 | Stop reason: HOSPADM

## 2021-05-25 RX ORDER — DEXTROSE 50 % IN WATER (D50W) INTRAVENOUS SYRINGE
12.5-25 AS NEEDED
Status: DISCONTINUED | OUTPATIENT
Start: 2021-05-25 | End: 2021-05-28 | Stop reason: HOSPADM

## 2021-05-25 RX ADMIN — CLOPIDOGREL BISULFATE 75 MG: 75 TABLET, FILM COATED ORAL at 08:16

## 2021-05-25 RX ADMIN — PERPHENAZINE 8 MG: 4 TABLET, FILM COATED ORAL at 08:15

## 2021-05-25 RX ADMIN — METFORMIN HYDROCHLORIDE 1000 MG: 500 TABLET ORAL at 08:16

## 2021-05-25 RX ADMIN — METFORMIN HYDROCHLORIDE 1000 MG: 500 TABLET ORAL at 17:02

## 2021-05-25 RX ADMIN — FOLIC ACID 1 MG: 1 TABLET ORAL at 08:16

## 2021-05-25 RX ADMIN — THERA TABS 1 TABLET: TAB at 08:16

## 2021-05-25 RX ADMIN — TRAZODONE HYDROCHLORIDE 100 MG: 100 TABLET ORAL at 21:40

## 2021-05-25 RX ADMIN — ATORVASTATIN CALCIUM 40 MG: 40 TABLET, FILM COATED ORAL at 08:16

## 2021-05-25 RX ADMIN — Medication 100 MG: at 08:15

## 2021-05-25 RX ADMIN — PHENOBARBITAL 64.8 MG: 32.4 TABLET ORAL at 11:41

## 2021-05-25 RX ADMIN — SERTRALINE 200 MG: 50 TABLET, FILM COATED ORAL at 08:16

## 2021-05-25 RX ADMIN — PHENOBARBITAL 32.4 MG: 32.4 TABLET ORAL at 21:43

## 2021-05-25 RX ADMIN — Medication 5000 UNITS: at 08:15

## 2021-05-25 RX ADMIN — PHENOBARBITAL 32.4 MG: 32.4 TABLET ORAL at 17:06

## 2021-05-25 RX ADMIN — PANTOPRAZOLE SODIUM 40 MG: 40 TABLET, DELAYED RELEASE ORAL at 08:16

## 2021-05-25 RX ADMIN — PHENOBARBITAL 64.8 MG: 32.4 TABLET ORAL at 08:16

## 2021-05-25 RX ADMIN — LISINOPRIL 40 MG: 20 TABLET ORAL at 08:15

## 2021-05-25 RX ADMIN — ASPIRIN 81 MG: 81 TABLET, COATED ORAL at 08:16

## 2021-05-25 NOTE — GROUP NOTE
SOHAIL  GROUP DOCUMENTATION INDIVIDUAL Group Therapy Note Date: 5/25/2021 Group Start Time: 1400 Group End Time: 1500 Group Topic: Recreational/Music Therapy [facility] [place] [address] ACUTE BEHAV [facility] [place], [address] GROUP DOCUMENTATION GROUP Group Therapy Note Attendees: 8 Attendance: Did not attend Patient's Goal: Interventions/techniques: 
Yamil Barrios

## 2021-05-25 NOTE — BH NOTES
PSYCHOSOCIAL ASSESSMENT  :Patient identifying info:   Raul Walters is a 76 y.o., male admitted 5/23/2021  4:58 PM     Presenting problem and precipitating factors: Pt was admitted on a voluntary basis due to SI. Pt's psychosocial stressors include his outpatient psych facility no longer accepting his insurance and pt restarted drinking alcohol during COVID-19 after 5 years of sobriety, a sick pet cat and poor sleep leading to him taking his wife's Trazodone. Mental status assessment: Pt was seen in treatment team this morning. Pt is alert and oriented. Pt denies SI/HI. Pt's mood is depressed, affect is WNL. Pt's thought process is logical. Pt's insight and judgment is impaired, reliability is good. Social work department will continue to coordinate discharge plans. Strengths: family support and seeking help     Collateral information: None at this time. Current psychiatric /substance abuse providers and contact info: None    Previous psychiatric/substance abuse providers and response to treatment: [facility]. Reports previous admission to [place] [facility] in late 2019 and [address] [place] [address] 2012. Pt reports OD on sleeping pills at age 15 years. Family history of mental illness or substance abuse: None reported. Substance abuse history: UDS: negative;  BAL=154   Social History     Tobacco Use    Smoking status: Current Every Day Smoker     Packs/day: 1.00    Smokeless tobacco: Never Used   Substance Use Topics    Alcohol use: Yes     Comment: drinks a fifth of wehiskey and a case a beer per day       History of biomedical complications associated with substance abuse : Shaking, no seizures reported. Patient's current acceptance of treatment or motivation for change: Fair    Family constellation:     Is significant other involved? Yes    Describe support system: Wife    Describe living arrangements and home environment: Pt reports he lives with his wife.      Health issues:   Hospital Problems  Date Reviewed: 2017        Codes Class Noted POA    [facility] ICD-10-CM: X47  ICD-9-CM: 296.90  2021 Unknown              Trauma history: None reported    Legal issues: None reported    History of  service: None    Financial status: Family    Oriental orthodox/cultural factors: None expressed at this time. Education/work history: 9th grade education. Have you been licensed as a health care professional (current or ): No    Leisure and recreation preferences: Unknown     Describe coping skills: ineffectual at this time.     Paulino Valerio  2021

## 2021-05-25 NOTE — H&P
INITIAL PSYCHIATRIC EVALUATION            IDENTIFICATION:    Patient Name  Carol Argueta   Date of Birth 1955   [facility] [id number]   Medical Record Number  [id number]      Age  76 y.o. PCP Skiff, Dorna Show, NP   Admit date:  5/23/2021    Room Number  327/01  @ [facility]   Date of Service  5/25/2021            HISTORY         REASON FOR HOSPITALIZATION:  CC: \"suicidal ideation / EtOH detox\". Pt admitted under a voluntary basis for suicidal ideations proving to be an imminent danger to self and an inability to care for self. HISTORY OF PRESENT ILLNESS:    The patient, Carol Argueta, is a 76 y.o. WHITE male with a past psychiatric history significant for MDD and EtOH use disorder, who presents at this time with complaints of (and/or evidence of) the following emotional symptoms: depression and suicidal thoughts/threats. Additional symptomatology include escalation of EtOH abuse. The above symptoms have been present for 2+ months. These symptoms are of moderate to high severity. These symptoms are constant in nature. The patient's condition has been precipitated by psychosocial stressors. Patient's condition made worse by alcohol use as well as treatment noncompliance. UDS: negative; BAL=+154 on admission. The patient is a fair historian. The patient corroborates the above narrative. The patient contracts for safety on the unit and gives consent for the team to contact collateral. The patient is amenable to initiating treatment while on the unit. The patient acknowledges high SSRI but states he would prefer to stay on the same dose despite a long period of not taking it. He denies having schizophrenia but then acknowledges hearing voices. He is open to streamlining his regimen, specifically he is taking a second antipsychotic with known instance of metabolic changes that may worsen his obesity and IDDM.  Per pharmacist, patient was supposed to be on Lantus but stopped along with other medications when his insurance changed. He reports a recent stroke vs TIA with some residual L sided weakness. The patient declines rehab placement but states he is open to taking Naltrexone. ALLERGIES:   Allergies   Allergen Reactions    Egg Swelling      MEDICATIONS PRIOR TO ADMISSION:   Medications Prior to Admission   Medication Sig    perphenazine (TRILAFON) 8 mg tablet Take 8 mg by mouth daily. Indications: bipolar 2 disorder    aspirin delayed-release 81 mg tablet Take 81 mg by mouth daily. Indications: prevention of transient ischemic attack    clopidogrel (PLAVIX) 75 mg tab Take 75 mg by mouth daily. Indications: prevention for a blood clot going to the brain    metFORMIN (GLUCOPHAGE) 1,000 mg tablet Take 1,000 mg by mouth two (2) times daily (with meals). Indications: type 2 diabetes mellitus    pantoprazole (PROTONIX) 40 mg tablet Take 40 mg by mouth daily. Indications: gastroesophageal reflux disease    sertraline (ZOLOFT) 100 mg tablet Take 200 mg by mouth daily. Indications: major depressive disorder    traZODone (DESYREL) 100 mg tablet Take 100 mg by mouth nightly. Indications: insomnia    cholecalciferol, VITAMIN D3, (VITAMIN D3) 5,000 unit tab tablet Take 5,000 Units by mouth daily. Indications: prevention of vitamin D deficiency    atorvastatin (LIPITOR) 40 mg tablet Take 40 mg by mouth daily.  OLANZapine (ZyPREXA) 2.5 mg tablet Take 2.5 mg by mouth nightly.  lisinopril (PRINIVIL, ZESTRIL) 40 mg tablet Take 40 mg by mouth daily.  Indications: high blood pressure      PAST MEDICAL HISTORY:   Past Medical History:   Diagnosis Date    B[facility] 2 disorder ([facility] [address] [facility]     Depression     DM (diabetes mellitus) ([facility] [address] [facility]     ETOH abuse     HTN (hypertension)     Stroke [facility])      Past Surgical History:   Procedure Laterality Date    HX HERNIA REPAIR        SOCIAL HISTORY:  The patient is currently retired; the patient is a smoker, and smokes up to 3 ppd; the patient's marital status is ; the patient reports the highest level of education achieved is 9th grade. 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 FAMILY HISTORY: History reviewed, pertinent family history as below:   History reviewed. No pertinent family history. REVIEW OF SYSTEMS:   Pertinent items are noted in the History of Present Illness. All other Systems reviewed and are considered negative. MENTAL STATUS EXAM & VITALS     MENTAL STATUS EXAM (MSE):    MSE FINDINGS ARE WITHIN NORMAL LIMITS (WNL) UNLESS OTHERWISE STATED BELOW. ( ALL OF THE BELOW CATEGORIES OF THE MSE HAVE BEEN REVIEWED (reviewed 5/25/2021) AND UPDATED AS DEEMED APPROPRIATE )  General Presentation age appropriate and overweight, cooperative   Orientation oriented to time, place and person   Vital Signs  See below (reviewed 5/25/2021); Vital Signs (BP, Pulse, & Temp) are within normal limits if not listed below.    Gait and Station Stable/steady, no ataxia   Musculoskeletal System No extrapyramidal symptoms (EPS); no abnormal muscular movements or Tardive Dyskinesia (TD); muscle strength and tone are within normal limits   Language No aphasia or dysarthria   Speech:  normal volume and non-pressured   Thought Processes concrete; normal rate of thoughts; good abstract reasoning/computation   Thought Associations goal directed   Thought Content preoccupations   Suicidal Ideations none   Homicidal Ideations none   Mood:  euthymic   Affect:  full range and mood-congruent   Memory recent  intact   Memory remote:  intact   Concentration/Attention:  intact   Fund of Knowledge average   Insight:  fair   Reliability fair   Judgment:  limited          VITALS:     Patient Vitals for the past 24 hrs:   Temp Pulse Resp BP SpO2   05/25/21 1139  (!) 57  (!) 149/74 96 %   05/25/21 0735 98 °F (36.7 °C) (!) 58 18 (!) 168/84 99 %   05/24/21 2130  (!) 46  (!) 143/74 95 %   05/24/21 2000 98 °F (36.7 °C) 61 18 (!) 189/91 95 %   05/24/21 1614 98.6 °F (37 °C) (!) 59 16 (!) 170/95 95 %   05/24/21 1441  68 18  94 %     Wt Readings from Last 3 Encounters:   05/23/21 110.7 kg (244 lb)   08/08/17 109.8 kg (242 lb)   10/23/16 109.3 kg (241 lb)     Temp Readings from Last 3 Encounters:   05/25/21 98 °F (36.7 °C)   08/08/17 97.6 °F (36.4 °C) (Oral)   10/23/16 97.5 °F (36.4 °C)     BP Readings from Last 3 Encounters:   05/25/21 (!) 149/74   08/08/17 154/79   10/23/16 154/86     Pulse Readings from Last 3 Encounters:   05/25/21 (!) 57   08/08/17 (!) 52   10/23/16 (!) 52            DATA     LABORATORY DATA:  Labs Reviewed   CBC WITH AUTOMATED DIFF - Abnormal; Notable for the following components:       Result Value    BASOPHILS 2 (*)     All other components within normal limits   METABOLIC PANEL, COMPREHENSIVE - Abnormal; Notable for the following components:    Glucose 185 (*)     BUN/Creatinine ratio 9 (*)     All other components within normal limits   ETHYL ALCOHOL - Abnormal; Notable for the following components:    ALCOHOL(ETHYL),SERUM 154 (*)     All other components within normal limits   GLUCOSE, POC - Abnormal; Notable for the following components:    Glucose (POC) 286 (*)     All other components within normal limits   DRUG SCREEN, URINE   COVID-19 WITH INFLUENZA A/B     Admission on 05/23/2021   Component Date Value Ref Range Status    WBC 05/23/2021 4.7  4.1 - 11.1 K/uL Final    RBC 05/23/2021 4.41  4.10 - 5.70 M/uL Final    HGB 05/23/2021 14.1  12.1 - 17.0 g/dL Final    HCT 05/23/2021 40.0  36.6 - 50.3 % Final    MCV 05/23/2021 90.7  80.0 - 99.0 FL Final    MCH 05/23/2021 32.0  26.0 - 34.0 PG Final    MCHC 05/23/2021 35.3  30.0 - 36.5 g/dL Final    RDW 05/23/2021 12.6  11.5 - 14.5 % Final    PLATELET 19/83/6820 069  150 - 400 K/uL Final    MPV 05/23/2021 10.4  8.9 - 12.9 FL Final    NRBC 05/23/2021 0.0  0  WBC Final    ABSOLUTE NRBC 05/23/2021 0.00  0.00 - 0.01 K/uL Final    NEUTROPHILS 05/23/2021 63  32 - 75 % Final    LYMPHOCYTES 05/23/2021 25 12 - 49 % Final    MONOCYTES 05/23/2021 7  5 - 13 % Final    EOSINOPHILS 05/23/2021 3  0 - 7 % Final    BASOPHILS 05/23/2021 2* 0 - 1 % Final    IMMATURE GRANULOCYTES 05/23/2021 0  0.0 - 0.5 % Final    ABS. NEUTROPHILS 05/23/2021 3.0  1.8 - 8.0 K/UL Final    ABS. LYMPHOCYTES 05/23/2021 1.2  0.8 - 3.5 K/UL Final    ABS. MONOCYTES 05/23/2021 0.3  0.0 - 1.0 K/UL Final    ABS. EOSINOPHILS 05/23/2021 0.1  0.0 - 0.4 K/UL Final    ABS. BASOPHILS 05/23/2021 0.1  0.0 - 0.1 K/UL Final    ABS. IMM. GRANS. 05/23/2021 0.0  0.00 - 0.04 K/UL Final    DF 05/23/2021 AUTOMATED    Final    Sodium 05/23/2021 136  136 - 145 mmol/L Final    Potassium 05/23/2021 3.9  3.5 - 5.1 mmol/L Final    Chloride 05/23/2021 98  97 - 108 mmol/L Final    CO2 05/23/2021 26  21 - 32 mmol/L Final    Anion gap 05/23/2021 12  5 - 15 mmol/L Final    Glucose 05/23/2021 185* 65 - 100 mg/dL Final    BUN 05/23/2021 8  6 - 20 MG/DL Final    Creatinine 05/23/2021 0.93  0.70 - 1.30 MG/DL Final    BUN/Creatinine ratio 05/23/2021 9* 12 - 20   Final    GFR est AA 05/23/2021 >60  >60 ml/min/1.73m2 Final    GFR est non-AA 05/23/2021 >60  >60 ml/min/1.73m2 Final    Calcium 05/23/2021 9.1  8.5 - 10.1 MG/DL Final    Bilirubin, total 05/23/2021 0.3  0.2 - 1.0 MG/DL Final    ALT (SGPT) 05/23/2021 68  12 - 78 U/L Final    AST (SGOT) 05/23/2021 36  15 - 37 U/L Final    Alk.  phosphatase 05/23/2021 59  45 - 117 U/L Final    Protein, total 05/23/2021 7.5  6.4 - 8.2 g/dL Final    Albumin 05/23/2021 4.1  3.5 - 5.0 g/dL Final    Globulin 05/23/2021 3.4  2.0 - 4.0 g/dL Final    A-G Ratio 05/23/2021 1.2  1.1 - 2.2   Final    ALCOHOL(ETHYL),SERUM 05/23/2021 154* <10 MG/DL Final    AMPHETAMINES 05/23/2021 Negative  NEG   Final    BARBITURATES 05/23/2021 Negative  NEG   Final    BENZODIAZEPINES 05/23/2021 Negative  NEG   Final    COCAINE 05/23/2021 Negative  NEG   Final    METHADONE 05/23/2021 Negative  NEG   Final    OPIATES 05/23/2021 Negative  NEG Final    PCP(PHENCYCLIDINE) 05/23/2021 Negative  NEG   Final    THC (TH-CANNABINOL) 05/23/2021 Negative  NEG   Final    Drug screen comment 05/23/2021 (NOTE)   Final    SARS-CoV-2 05/23/2021 Not detected  NOTD   Final    Influenza A by PCR 05/23/2021 Not detected  NOTD   Final    Influenza B by PCR 05/23/2021 Not detected  NOTD   Final    Glucose (POC) 05/25/2021 286* 65 - 117 mg/dL Final    Performed by 05/25/2021 Gasper Alexander   Final        RADIOLOGY REPORTS:  Results from Hospital Encounter encounter on 09/15/12    XR CHEST PA LAT    Narrative  **Final Report**      ICD Codes / Adm. Diagnosis: 904780  V62.84 / Mental Health Problem  Examination:  CR CHEST PA AND LATERAL  - [id number] - Sep 15 2012  2:44AM  Accession No:  [id number]  Reason:  chest pain      REPORT:  INDICATION: chest pain    EXAM: CXR 2 Views. FINDINGS: Frontal and lateral views of the chest show the lungs are free of  acute disease. Heart size is normal. There is no pulmonary edema. There is  no evident adenopathy or pleural effusion. There is no significant change  since prior study. IMPRESSION: No acute disease. No significant interval change. Signing/Reading Doctor: Chip Briscoe ([id number])  Approved: Chip Briscoe ([id number])  09/15/2012  No results found.            MEDICATIONS       ALL MEDICATIONS  Current Facility-Administered Medications   Medication Dose Route Frequency    [START ON 5/26/2021] sertraline (ZOLOFT) tablet 50 mg  50 mg Oral DAILY    loperamide (IMODIUM) capsule 2 mg  2 mg Oral PRN    ibuprofen (MOTRIN) tablet 400 mg  400 mg Oral Q8H PRN    dicyclomine (BENTYL) 10 mg/mL injection 20 mg  20 mg IntraMUSCular Q6H PRN    magnesium hydroxide (MILK OF MAGNESIA) 400 mg/5 mL oral suspension 30 mL  30 mL Oral DAILY PRN    folic acid (FOLVITE) tablet 1 mg  1 mg Oral DAILY    thiamine HCL (B-1) tablet 100 mg  100 mg Oral DAILY    cloNIDine HCL (CATAPRES) tablet 0.1 mg  0.1 mg Oral Q4H PRN    PHENobarbitaL (LUMINAL) tablet 64.8 mg  64.8 mg Oral QID    Followed by   [facility] PHENobarbitaL (LUMINAL) tablet 32.4 mg  32.4 mg Oral QID    Followed by   [facility]  ON 5/26/2021] PHENobarbitaL (LUMINAL) tablet 32.4 mg  32.4 mg Oral BID    Followed by   [facility]  ON 5/27/2021] PHENobarbitaL (LUMINAL) tablet 16.2 mg  16.2 mg Oral BID    PHENobarbitaL (LUMINAL) tablet 64.8 mg  64.8 mg Oral Q6H PRN    Followed by   [facility] PHENobarbitaL (LUMINAL) tablet 32.4 mg  32.4 mg Oral Q6H PRN    Followed by   [facility]  ON 5/27/2021] PHENobarbitaL (LUMINAL) tablet 16.2 mg  16.2 mg Oral Q6H PRN    therapeutic multivitamin (THERAGRAN) tablet 1 Tablet  1 Tablet Oral DAILY    aspirin delayed-release tablet 81 mg  81 mg Oral DAILY    atorvastatin (LIPITOR) tablet 40 mg  40 mg Oral DAILY    cholecalciferol (VITAMIN D3) (1000 Units /25 mcg) tablet 5,000 Units  5,000 Units Oral DAILY    clopidogreL (PLAVIX) tablet 75 mg  75 mg Oral DAILY    lisinopriL (PRINIVIL, ZESTRIL) tablet 40 mg  40 mg Oral DAILY    metFORMIN (GLUCOPHAGE) tablet 1,000 mg  1,000 mg Oral BID WITH MEALS    pantoprazole (PROTONIX) tablet 40 mg  40 mg Oral DAILY    perphenazine (TRILAFON) tablet tab 8 mg  8 mg Oral DAILY    traZODone (DESYREL) tablet 100 mg  100 mg Oral QHS    nicotine (NICODERM CQ) 14 mg/24 hr patch 1 Patch  1 Patch TransDERmal DAILY      SCHEDULED MEDICATIONS  Current Facility-Administered Medications   Medication Dose Route Frequency    [START ON 5/26/2021] sertraline (ZOLOFT) tablet 50 mg  50 mg Oral DAILY    folic acid (FOLVITE) tablet 1 mg  1 mg Oral DAILY    thiamine HCL (B-1) tablet 100 mg  100 mg Oral DAILY    PHENobarbitaL (LUMINAL) tablet 64.8 mg  64.8 mg Oral QID    Followed by   [facility] PHENobarbitaL (LUMINAL) tablet 32.4 mg  32.4 mg Oral QID    Followed by   [facility]  ON 5/26/2021] PHENobarbitaL (LUMINAL) tablet 32.4 mg  32.4 mg Oral BID    Followed by   [facility]  ON 5/27/2021] PHENobarbitaL (LUMINAL) tablet 16.2 mg  16.2 mg Oral BID    therapeutic multivitamin (THERAGRAN) tablet 1 Tablet  1 Tablet Oral DAILY    aspirin delayed-release tablet 81 mg  81 mg Oral DAILY    atorvastatin (LIPITOR) tablet 40 mg  40 mg Oral DAILY    cholecalciferol (VITAMIN D3) (1000 Units /25 mcg) tablet 5,000 Units  5,000 Units Oral DAILY    clopidogreL (PLAVIX) tablet 75 mg  75 mg Oral DAILY    lisinopriL (PRINIVIL, ZESTRIL) tablet 40 mg  40 mg Oral DAILY    metFORMIN (GLUCOPHAGE) tablet 1,000 mg  1,000 mg Oral BID WITH MEALS    pantoprazole (PROTONIX) tablet 40 mg  40 mg Oral DAILY    perphenazine (TRILAFON) tablet tab 8 mg  8 mg Oral DAILY    traZODone (DESYREL) tablet 100 mg  100 mg Oral QHS    nicotine (NICODERM CQ) 14 mg/24 hr patch 1 Patch  1 Patch TransDERmal DAILY                ASSESSMENT & PLAN        The patient, Anais Lara, is a 76 y.o.  male who presents at this time for treatment of the following diagnoses:  Patient Active Hospital Problem List:   Schizoaffective disorder    Assessment: patient with depression and alcohol abuse by history, also with history of AH though he appears in no distress presently. History most consistent with schizoaffective disorder; patient would benefit from psychosocial intervention to ensure he does not run out of medication in the future. He presently wants to detox from alcohol, he is precontemplative about going to rehab but drinks large amounts of EtOH regularly per history.     Plan:   - RESTART Zoloft 200 mg QDAY for MDD  - RESTART and TAPER Trilafon to 4 mg QDAY for AH  - DISCONTINUE Zyprexa due to polypharmacy  - Consider Naltrexone for EtOH use disorder  - EtOH precautions  -  Phenobarbital high dose taper (64 --> 16 mg)  - IGM therapy as tolerated  - Expand database / obtain collateral  - Dispo planning (reestablish services, medication pricing)           A coordinated, multidisplinary treatment team (includes the nurse, unit pharmacist,  and writer) round was conducted for this initial evaluation with the patient present. The following regarding medications was addressed during rounds with patient: the risks and benefits of the proposed medication. The patient was given the opportunity to ask questions. Informed consent given to the use of the above medications. I will continue to adjust psychiatric and non-psychiatric medications (see above \"medication\" section and orders section for details) as deemed appropriate & based upon diagnoses and response to treatment. I have reviewed admission (and previous/old) labs and medical tests in the EHR and or transferring hospital documents. I will continue to order blood tests/labs and diagnostic tests as deemed appropriate and review results as they become available (see orders for details). I have reviewed old psychiatric and medical records available in the EHR. I Will order additional psychiatric records from other institutions to further elucidate the nature of patient's psychopathology and review once available. I will gather additional collateral information from friends, family and o/p treatment team to further elucidate the nature of patient's psychopathology and baselline level of psychiatric functioning. I certify that this patient's inpatient psychiatric hospital services are required for treatment that could reasonably be expected to improve the patient's condition, or for diagnostic study, and that the patient continues to need, on a daily basis, active treatment furnished directly by or requiring the supervision of inpatient psychiatric facility personnel. In addition, the hospital records show that services furnished were intensive treatment services, admission or related services, or equivalent services.       ESTIMATED LENGTH OF STAY:  3-5 days       STRENGTHS:  Exercising self-direction/Resourceful, Knowledge of medications and Awareness of Substance abuse issues                                        SIGNED:    Jimi Chavarria MD  5/25/2021

## 2021-05-25 NOTE — PROGRESS NOTES
Laboratory monitoring for mood stabilizer and antipsychotics:    Recommended baseline monitoring has been completed based on this patient's current medication regimen. Of note, A1c is elevated at 10.3%. The patient is currently taking the following medication(s):   Current Facility-Administered Medications   Medication Dose Route Frequency    insulin glargine (LANTUS) injection 15 Units  15 Units SubCUTAneous QHS    insulin lispro (HUMALOG) injection   SubCUTAneous TIDAC    sertraline (ZOLOFT) tablet 200 mg  200 mg Oral DAILY    perphenazine (TRILAFON) tablet tab 4 mg  4 mg Oral DAILY    folic acid (FOLVITE) tablet 1 mg  1 mg Oral DAILY    thiamine HCL (B-1) tablet 100 mg  100 mg Oral DAILY    PHENobarbitaL (LUMINAL) tablet 16.2 mg  16.2 mg Oral BID    therapeutic multivitamin (THERAGRAN) tablet 1 Tablet  1 Tablet Oral DAILY    aspirin delayed-release tablet 81 mg  81 mg Oral DAILY    atorvastatin (LIPITOR) tablet 40 mg  40 mg Oral DAILY    cholecalciferol (VITAMIN D3) (1000 Units /25 mcg) tablet 5,000 Units  5,000 Units Oral DAILY    clopidogreL (PLAVIX) tablet 75 mg  75 mg Oral DAILY    lisinopriL (PRINIVIL, ZESTRIL) tablet 40 mg  40 mg Oral DAILY    metFORMIN (GLUCOPHAGE) tablet 1,000 mg  1,000 mg Oral BID WITH MEALS    pantoprazole (PROTONIX) tablet 40 mg  40 mg Oral DAILY    traZODone (DESYREL) tablet 100 mg  100 mg Oral QHS    nicotine (NICODERM CQ) 14 mg/24 hr patch 1 Patch  1 Patch TransDERmal DAILY       Height, Weight, BMI Estimation  Estimated body mass index is 33.09 kg/m² as calculated from the following:    Height as of this encounter: 182.9 cm (72\"). Weight as of this encounter: 110.7 kg (244 lb). Renal Function, Hepatic Function and Chemistry  Estimated Creatinine Clearance: 100.3 mL/min (by C-G formula based on SCr of 0.93 mg/dL).     Lab Results   Component Value Date/Time    Sodium 136 05/23/2021 06:15 PM    Potassium 3.9 05/23/2021 06:15 PM    Chloride 98 05/23/2021 06:15 PM    CO2 26 05/23/2021 06:15 PM    Anion gap 12 05/23/2021 06:15 PM    Glucose 185 (H) 05/23/2021 06:15 PM    Glucose 211 (H) 09/06/2012 04:17 AM    BUN 8 05/23/2021 06:15 PM    Creatinine 0.93 05/23/2021 06:15 PM    BUN/Creatinine ratio 9 (L) 05/23/2021 06:15 PM    GFR est AA >60 05/23/2021 06:15 PM    GFR est non-AA >60 05/23/2021 06:15 PM    Calcium 9.1 05/23/2021 06:15 PM    ALT (SGPT) 68 05/23/2021 06:15 PM    Alk.  phosphatase 59 05/23/2021 06:15 PM    Protein, total 7.5 05/23/2021 06:15 PM    Albumin 4.1 05/23/2021 06:15 PM    Globulin 3.4 05/23/2021 06:15 PM    A-G Ratio 1.2 05/23/2021 06:15 PM    Bilirubin, total 0.3 05/23/2021 06:15 PM       Lab Results   Component Value Date/Time    Glucose 185 (H) 05/23/2021 06:15 PM    Glucose 211 (H) 09/06/2012 04:17 AM    Glucose (POC) 231 (H) 05/27/2021 07:59 AM       Lab Results   Component Value Date/Time    Hemoglobin A1c 10.3 (H) 05/26/2021 05:25 AM       Hematology  Lab Results   Component Value Date/Time    WBC 4.7 05/23/2021 06:15 PM    HGB 14.1 05/23/2021 06:15 PM    HCT 40.0 05/23/2021 06:15 PM    PLATELET 734 14/76/4350 06:15 PM    MCV 90.7 05/23/2021 06:15 PM       Lipids  Lab Results   Component Value Date/Time    Cholesterol, total 173 05/26/2021 05:25 AM    HDL Cholesterol 68 05/26/2021 05:25 AM    LDL, calculated 83.8 05/26/2021 05:25 AM    Triglyceride 106 05/26/2021 05:25 AM    CHOL/HDL Ratio 2.5 05/26/2021 05:25 AM       Thyroid Function    Lab Results   Component Value Date/Time    TSH 1.30 05/26/2021 05:25 AM     Vitals  Visit Vitals  /69 (BP 1 Location: Left upper arm, BP Patient Position: Standing)   Pulse 60   Temp 98.3 °F (36.8 °C)   Resp 18   Ht 182.9 cm (72\")   Wt 110.7 kg (244 lb)   SpO2 97%   BMI 33.09 kg/m²       LOIDA Torres, BCPS  [phone] (pharmacy)

## 2021-05-25 NOTE — BH NOTES
GROUP THERAPY PROGRESS NOTE    Patient did not participate in Substance abuse/Coping Skills group.      Loly Pérez LPC LSATP [facility]

## 2021-05-25 NOTE — BH NOTES
2019  PRN Clonidine administered for high blood pressure 189/91 pulse 61.     2130  Notified NP FAITH Abdi of patient's pulse of 46. Gave order to hold scheduled phenobarbital 64.8mg and given 32.4mg instead. CIWA currently a 5. Will continue to monitor patient. 2300  Patient appears to be sleeping.    0200  Patient sleeping.    0400 Patient sleeping.    0600  Patient slept for 8 hours this shift. Patient woke up requesting coffee. Reports that he will go back to bed until breakfast. No voiced concerns at this time. Will continue to monitor for safety, location,, and behavior [id number].

## 2021-05-25 NOTE — PROGRESS NOTES
Problem: Falls - Risk of  Goal: *Absence of Falls  Description: Document Alfred Walton Fall Risk and appropriate interventions in the flowsheet.   Outcome: Progressing Towards Goal  Note: Fall Risk Interventions:

## 2021-05-25 NOTE — PROGRESS NOTES
Problem: Discharge Planning  Goal: *Discharge to safe environment  Outcome: Progressing Towards Goal  Note: Patient identifies home as a safe environment. Patient will return home upon discharge. Goal: *Knowledge of medication management  Outcome: Progressing Towards Goal  Note:   Patient verbalizes understanding of medication regimen. Patient is taking medications as prescribed. Goal: *Knowledge of discharge instructions  Outcome: Progressing Towards Goal  Note: Patient verbalizes understanding of goals for treatment and safe discharge.

## 2021-05-25 NOTE — PROGRESS NOTES
Problem: Substance Use - Stabilization Plan  Goal: Patient/Family Education  Outcome: Progressing Towards Goal  Goal: *LTG: Develop increased awareness of phsical relapse triggers and coping strategies  Description: Develop increased awareness of phsical relapse triggers and coping strategies to effectively deal with them. Outcome: Progressing Towards Goal     Problem: Depressed Mood (Adult/Pediatric)  Goal: *STG: Participates in treatment plan  Outcome: Progressing Towards Goal  Goal: *STG: Remains safe in hospital  Outcome: Progressing Towards Goal  Goal: *STG: Complies with medication therapy  Outcome: Progressing Towards Goal  Goal: *LTG: Returns to previous level of functioning and participates with after care plan  Outcome: Progressing Towards Goal       0700: Shift change report given to Cindy DUQUE (oncoming nurse) by Tian Ross (offgoing nurse). Report included the following information SBAR, Kardex, MAR and Recent Results. 8412-0080: Assumed care of patient. Met patient in the dayroom. Patient denied anxiety, depression, SI, HI, AVH and pain. Recorded patients CIWA at a 0. Blood sugar 286. Patient calm and cooperative during assessment. Med and meal compliant. 2739-1188: Patient in bed sleeping. No signs of distress noted at this time. 1545-3966: Recorded patients CIWA at a 0. Patient medication and meal compliant. Patient in bed sleeping. No issues noted. 6148-3974: Patient to dayroom to attend groups, interacting appropriately with staff and peers. Patient scored a 0 on CIWA scale. 7879-4379: Patient medication and meal compliant. No issues noted.

## 2021-05-25 NOTE — GROUP NOTE
SOHAIL  GROUP DOCUMENTATION INDIVIDUAL Group Therapy Note Date: 5/25/2021 Group Start Time: 1000 Group End Time: 1100 Group Topic: Topic Group [facility] 3 ACUTE BEHAV [facility] [place], [address] [facility] GROUP DOCUMENTATION GROUP Group Therapy Note Attendees: 6 Attendance: Did not attend Patient's Goal: Interventions/techniques: 
Aj De La O

## 2021-05-26 LAB
CHOLEST SERPL-MCNC: 173 MG/DL
EST. AVERAGE GLUCOSE BLD GHB EST-MCNC: 249 MG/DL
GLUCOSE BLD STRIP.AUTO-MCNC: 197 MG/DL (ref 65–117)
GLUCOSE BLD STRIP.AUTO-MCNC: 289 MG/DL (ref 65–117)
GLUCOSE BLD STRIP.AUTO-MCNC: 292 MG/DL (ref 65–117)
GLUCOSE BLD STRIP.AUTO-MCNC: 298 MG/DL (ref 65–117)
HBA1C MFR BLD: 10.3 % (ref 4–5.6)
HDLC SERPL-MCNC: 68 MG/DL
HDLC SERPL: 2.5 {RATIO} (ref 0–5)
LDLC SERPL CALC-MCNC: 83.8 MG/DL (ref 0–100)
SERVICE CMNT-IMP: ABNORMAL
TRIGL SERPL-MCNC: 106 MG/DL (ref ?–150)
TSH SERPL DL<=0.05 MIU/L-ACNC: 1.3 UIU/ML (ref 0.36–3.74)
VLDLC SERPL CALC-MCNC: 21.2 MG/DL

## 2021-05-26 PROCEDURE — 74011636637 HC RX REV CODE- 636/637: Performed by: PSYCHIATRY & NEUROLOGY

## 2021-05-26 PROCEDURE — 84443 ASSAY THYROID STIM HORMONE: CPT

## 2021-05-26 PROCEDURE — 82962 GLUCOSE BLOOD TEST: CPT

## 2021-05-26 PROCEDURE — 65220000003 HC RM SEMIPRIVATE PSYCH

## 2021-05-26 PROCEDURE — 36415 COLL VENOUS BLD VENIPUNCTURE: CPT

## 2021-05-26 PROCEDURE — 99232 SBSQ HOSP IP/OBS MODERATE 35: CPT | Performed by: PSYCHIATRY & NEUROLOGY

## 2021-05-26 PROCEDURE — 74011250637 HC RX REV CODE- 250/637: Performed by: PSYCHIATRY & NEUROLOGY

## 2021-05-26 PROCEDURE — 74011250637 HC RX REV CODE- 250/637: Performed by: EMERGENCY MEDICINE

## 2021-05-26 PROCEDURE — 80061 LIPID PANEL: CPT

## 2021-05-26 PROCEDURE — 83036 HEMOGLOBIN GLYCOSYLATED A1C: CPT

## 2021-05-26 RX ADMIN — TRAZODONE HYDROCHLORIDE 100 MG: 100 TABLET ORAL at 21:42

## 2021-05-26 RX ADMIN — PANTOPRAZOLE SODIUM 40 MG: 40 TABLET, DELAYED RELEASE ORAL at 08:25

## 2021-05-26 RX ADMIN — SERTRALINE 200 MG: 50 TABLET, FILM COATED ORAL at 08:22

## 2021-05-26 RX ADMIN — ASPIRIN 81 MG: 81 TABLET, COATED ORAL at 08:22

## 2021-05-26 RX ADMIN — Medication 5000 UNITS: at 08:21

## 2021-05-26 RX ADMIN — FOLIC ACID 1 MG: 1 TABLET ORAL at 08:22

## 2021-05-26 RX ADMIN — INSULIN LISPRO 5 UNITS: 100 INJECTION, SOLUTION INTRAVENOUS; SUBCUTANEOUS at 16:46

## 2021-05-26 RX ADMIN — Medication 100 MG: at 08:21

## 2021-05-26 RX ADMIN — INSULIN LISPRO 5 UNITS: 100 INJECTION, SOLUTION INTRAVENOUS; SUBCUTANEOUS at 11:52

## 2021-05-26 RX ADMIN — PHENOBARBITAL 32.4 MG: 32.4 TABLET ORAL at 08:25

## 2021-05-26 RX ADMIN — CLOPIDOGREL BISULFATE 75 MG: 75 TABLET, FILM COATED ORAL at 08:23

## 2021-05-26 RX ADMIN — INSULIN LISPRO 5 UNITS: 100 INJECTION, SOLUTION INTRAVENOUS; SUBCUTANEOUS at 08:22

## 2021-05-26 RX ADMIN — THERA TABS 1 TABLET: TAB at 08:22

## 2021-05-26 RX ADMIN — ATORVASTATIN CALCIUM 40 MG: 40 TABLET, FILM COATED ORAL at 08:22

## 2021-05-26 RX ADMIN — LISINOPRIL 40 MG: 20 TABLET ORAL at 08:26

## 2021-05-26 RX ADMIN — PHENOBARBITAL 32.4 MG: 32.4 TABLET ORAL at 11:52

## 2021-05-26 RX ADMIN — PHENOBARBITAL 32.4 MG: 32.4 TABLET ORAL at 16:46

## 2021-05-26 RX ADMIN — METFORMIN HYDROCHLORIDE 1000 MG: 500 TABLET ORAL at 08:25

## 2021-05-26 RX ADMIN — PERPHENAZINE 4 MG: 4 TABLET, FILM COATED ORAL at 08:21

## 2021-05-26 RX ADMIN — METFORMIN HYDROCHLORIDE 1000 MG: 500 TABLET ORAL at 16:46

## 2021-05-26 NOTE — GROUP NOTE
SOHAIL  GROUP DOCUMENTATION INDIVIDUAL Group Therapy Note Date: 5/26/2021 Group Start Time: 0900 Group End Time: 1000 Group Topic: Topic Group [address] 3 ACUTE BEHAV [facility], [address] GROUP DOCUMENTATION GROUP Group Therapy Note Attendees: 6 Attendance: Attended Patient's Goal:  To develop a personal plan for success Interventions/techniques: Supported-positive coping strategies,goals Interactions: Interacted appropriately Mental Status: Calm Behavior/appearance: Needed prompting Goals Achieved: Additional Notes:  Pt declined active participation -left session early Lottie Georges

## 2021-05-26 NOTE — PROGRESS NOTES
Problem: Falls - Risk of  Goal: *Absence of Falls  Description: Document Becky Vasquez Fall Risk and appropriate interventions in the flowsheet. Outcome: Progressing Towards Goal  Note: Fall Risk Interventions:      Problem: Substance Use - Stabilization Plan  Goal: Patient/Family Education  Outcome: Progressing Towards Goal       9513-6834 Shift report received from [address] [place] [address] Patient met in the hallway. He was cooperative, alert and oriented x 4. Denied SI/HI/VH/AH, anxiety and depression. He took his due medication and to bed. His CIWA score is 0. Quick 15 minutes checks is ongoing. No violence recorded. 2200- 0000 Patient sleeping. No behavioral health issues recorded. Q15 minutes checks ongoing. 4305-4567 Patient asleep. No behavioral health issues recorded. 6502-0908 Sleeping. Rounding ongoing for safety. 0400- 0600 Patient sleeping and resting in his room. Labs drawn. He slept for 8.75 hours.

## 2021-05-26 NOTE — BH NOTES
0800 Report given from [facility]. Pt is resting in room. 1000 pt is visible calm and cooperative. Pt denies si/hi/a/v collazo. Depression 5/10. Pt is socializing with peers. Medication and meal complaint. Attending groups. Denies withdrawal.     1200 ate lunch. Visible on the unit. 1400 pt is visible on the unit. Attending groups. 1600 pt is visible on the unit. 1800 pt ate dinner. Pt is visible on the unit.

## 2021-05-26 NOTE — BH NOTES
Behavioral Health Treatment Team Note       Patient goal(s) for today: Attend groups   Treatment team focus/goals: Dispo planning   Progress note: Pt was seen in treatment team this morning. Pt is alert and oriented. Pt denies SI/HI. Pt's mood is euthymic, affect is WNL Pt's thought process is logical.  Pt's insight and judgment is fair, reliability is good. Social work department will continue to coordinate discharge plans. LOS:  2  Expected LOS: 5/27? Financial concerns/prescription coverage:  BLUE CROSS MEDICARE  Date of last family contact: None     Family requesting physician contact today:  N/A  Discharge plan: Home  Guns in the home: None involved. Outpatient provider(s): To be linked.      Participating treatment team members: SERG Da Silva and Dr. Dania Arnold MD

## 2021-05-26 NOTE — BH NOTES
PSYCHIATRIC PROGRESS NOTE         Patient Name  Adriane Leon   Date of Birth 1955   [facility] [id number]   Medical Record Number  [id number]      Age  76 y.o. PCP Skiff, Isobel Ball, NP   Admit date:  5/23/2021    Room Number  327/02  @ [facility]   Date of Service  5/26/2021         E & M PROGRESS NOTE:         HISTORY       CC:  \"suicidal ideation / EtOH detox\"  HISTORY OF PRESENT ILLNESS/INTERVAL HISTORY:  (reviewed/updated 5/26/2021). per initial evaluation: The patient, Adriane Leon, is a 76 y.o. WHITE male with a past psychiatric history significant for MDD and EtOH use disorder, who presents at this time with complaints of (and/or evidence of) the following emotional symptoms: depression and suicidal thoughts/threats. Additional symptomatology include escalation of EtOH abuse. The above symptoms have been present for 2+ months. These symptoms are of moderate to high severity. These symptoms are constant in nature. The patient's condition has been precipitated by psychosocial stressors. Patient's condition made worse by alcohol use as well as treatment noncompliance. UDS: negative; BAL=+154 on admission.      The patient is a fair historian. The patient corroborates the above narrative. The patient contracts for safety on the unit and gives consent for the team to contact collateral. The patient is amenable to initiating treatment while on the unit. The patient acknowledges high SSRI but states he would prefer to stay on the same dose despite a long period of not taking it. He denies having schizophrenia but then acknowledges hearing voices. He is open to streamlining his regimen, specifically he is taking a second antipsychotic with known instance of metabolic changes that may worsen his obesity and IDDM. Per pharmacist, patient was supposed to be on Lantus but stopped along with other medications when his insurance changed.  He reports a recent stroke vs TIA with some residual L sided weakness. The patient declines rehab placement but states he is open to taking Naltrexone. 5/26 - patient slept 8 hours overnight. He is calm and cooperative, allowed blood draw this morning, thus far TSH WNL. Patient has been visible, able to make his needs known. He denies SI/HI/AVH/PI. Discussed schizophrenia as patient has no charted history of psychotic illness. He states that he in fact was diagnosed with schizophrenia in the past and this is why he is taking Trilafon. Patient's wife confirmed the regimen. Patient has been otherwise in good behavioral control. SIDE EFFECTS: (reviewed/updated 5/26/2021)  None reported or admitted to. ALLERGIES:(reviewed/updated 5/26/2021)  Allergies   Allergen Reactions    Egg Swelling      MEDICATIONS PRIOR TO ADMISSION:(reviewed/updated 5/26/2021)  Medications Prior to Admission   Medication Sig    perphenazine (TRILAFON) 8 mg tablet Take 8 mg by mouth daily. Indications: bipolar 2 disorder    aspirin delayed-release 81 mg tablet Take 81 mg by mouth daily. Indications: prevention of transient ischemic attack    clopidogrel (PLAVIX) 75 mg tab Take 75 mg by mouth daily. Indications: prevention for a blood clot going to the brain    metFORMIN (GLUCOPHAGE) 1,000 mg tablet Take 1,000 mg by mouth two (2) times daily (with meals). Indications: type 2 diabetes mellitus    pantoprazole (PROTONIX) 40 mg tablet Take 40 mg by mouth daily. Indications: gastroesophageal reflux disease    sertraline (ZOLOFT) 100 mg tablet Take 200 mg by mouth daily. Indications: major depressive disorder    traZODone (DESYREL) 100 mg tablet Take 100 mg by mouth nightly. Indications: insomnia    cholecalciferol, VITAMIN D3, (VITAMIN D3) 5,000 unit tab tablet Take 5,000 Units by mouth daily. Indications: prevention of vitamin D deficiency    atorvastatin (LIPITOR) 40 mg tablet Take 40 mg by mouth daily.  OLANZapine (ZyPREXA) 2.5 mg tablet Take 2.5 mg by mouth nightly.     lisinopril (PRINIVIL, ZESTRIL) 40 mg tablet Take 40 mg by mouth daily. Indications: high blood pressure      PAST MEDICAL HISTORY: Past medical history from the initial psychiatric evaluation has been reviewed (reviewed/updated 5/26/2021) with no additional updates (I asked patient and no additional past medical history provided). Past Medical History:   Diagnosis Date    Bipolar 2 disorder ([facility] [address]     Depression     DM (diabetes mellitus) ([facility]     ETOH abuse     HTN (hypertension)     Stroke [facility])      Past Surgical History:   Procedure Laterality Date    HX HERNIA REPAIR        SOCIAL HISTORY: Social history from the initial psychiatric evaluation has been reviewed (reviewed/updated 5/26/2021) with no additional updates (I asked patient and no additional social history provided). Social History     Socioeconomic History    Marital status:      Spouse name: Not on file    Number of children: Not on file    Years of education: Not on file    Highest education level: Not on file   Occupational History    Not on file   Tobacco Use    Smoking status: Current Every Day Smoker     Packs/day: 1.00    Smokeless tobacco: Never Used   Substance and Sexual Activity    Alcohol use: Yes     Comment: drinks a fifth of wehiskey and a case a beer per day    Drug use: No    Sexual activity: Not on file   Other Topics Concern    Not on file   Social History Narrative    Not on file     Social Determinants of Health     Financial Resource Strain:     Difficulty of Paying Living Expenses:    Food Insecurity:     Worried About Running Out of Food in the Last Year:     Dulce Maria of Food in the Last Year:    Transportation Needs:     Lack of Transportation (Medical):      Lack of Transportation (Non-Medical):    Physical Activity:     Days of Exercise per Week:     Minutes of Exercise per Session:    Stress:     Feeling of Stress :    Social Connections:     Frequency of Communication with Friends and Family:     Frequency of Social Gatherings with Friends and Family:     Attends Muslim Services:     Active Member of Clubs or Organizations:     Attends Club or Organization Meetings:     Marital Status:    Intimate Partner Violence:     Fear of Current or Ex-Partner:     Emotionally Abused:     Physically Abused:     Sexually Abused:       FAMILY HISTORY: Family history from the initial psychiatric evaluation has been reviewed (reviewed/updated 5/26/2021) with no additional updates (I asked patient and no additional family history provided). History reviewed. No pertinent family history. REVIEW OF SYSTEMS: (reviewed/updated 5/26/2021)  Appetite:no change from normal   Sleep: no change   All other Review of Systems: Negative except per HPI         [facility] (MSE):    MSE FINDINGS ARE WITHIN NORMAL LIMITS (WNL) UNLESS OTHERWISE STATED BELOW. ( ALL OF THE BELOW CATEGORIES OF THE MSE HAVE BEEN REVIEWED (reviewed 5/26/2021) AND UPDATED AS DEEMED APPROPRIATE )  General Presentation age appropriate, guarded   Orientation oriented to time, place and person   Vital Signs  See below (reviewed 5/26/2021); Vital Signs (BP, Pulse, & Temp) are within normal limits if not listed below.    Gait and Station Stable/steady, no ataxia   Musculoskeletal System No extrapyramidal symptoms (EPS); no abnormal muscular movements or Tardive Dyskinesia (TD); muscle strength and tone are within normal limits   Language No aphasia or dysarthria   Speech:  normal volume and non-pressured   Thought Processes coherent; normal rate of thoughts; fair abstract reasoning/computation   Thought Associations goal directed   Thought Content preoccupations   Suicidal Ideations none   Homicidal Ideations none   Mood:  euthymic   Affect:  mood-congruent   Memory recent  intact   Memory remote:  intact   Concentration/Attention:  intact   Fund of Knowledge average   Insight:  limited   Reliability fair   Judgment: fair          VITALS:     Patient Vitals for the past 24 hrs:   Temp Pulse Resp BP SpO2   05/26/21 0738 97 °F (36.1 °C) (!) 59 16 117/75 98 %   05/25/21 2001 97.3 °F (36.3 °C) (!) 59 18 (!) 147/85 97 %   05/25/21 1609  (!) 57  (!) 150/75 98 %   05/25/21 1139  (!) 57  (!) 149/74 96 %     Wt Readings from Last 3 Encounters:   05/23/21 110.7 kg (244 lb)   08/08/17 109.8 kg (242 lb)   10/23/16 109.3 kg (241 lb)     Temp Readings from Last 3 Encounters:   05/26/21 97 °F (36.1 °C)   08/08/17 97.6 °F (36.4 °C) (Oral)   10/23/16 97.5 °F (36.4 °C)     BP Readings from Last 3 Encounters:   05/26/21 117/75   08/08/17 154/79   10/23/16 154/86     Pulse Readings from Last 3 Encounters:   05/26/21 (!) 59   08/08/17 (!) 52   10/23/16 (!) 52            DATA     LABORATORY DATA:(reviewed/updated 5/26/2021)  Recent Results (from the past 24 hour(s))   TSH 3RD GENERATION    Collection Time: 05/26/21  5:25 AM   Result Value Ref Range    TSH 1.30 0.36 - 3.74 uIU/mL   GLUCOSE, POC    Collection Time: 05/26/21  8:08 AM   Result Value Ref Range    Glucose (POC) 298 (H) 65 - 117 mg/dL    Performed by 2housess      No results found for: VALF2, VALAC, VALP, VALPR, DS6, CRBAM, CRBAMP, CARB2, XCRBAM  No results found for: LITHM   RADIOLOGY REPORTS:(reviewed/updated 5/26/2021)  No results found.        MEDICATIONS     ALL MEDICATIONS:   Current Facility-Administered Medications   Medication Dose Route Frequency    insulin lispro (HUMALOG) injection   SubCUTAneous TIDAC    glucose chewable tablet 16 g  4 Tablet Oral PRN    dextrose (D50W) injection syrg 12.5-25 g  12.5-25 g IntraVENous PRN    glucagon (GLUCAGEN) injection 1 mg  1 mg IntraMUSCular PRN    sertraline (ZOLOFT) tablet 200 mg  200 mg Oral DAILY    perphenazine (TRILAFON) tablet tab 4 mg  4 mg Oral DAILY    OLANZapine (ZyPREXA) tablet 2.5 mg  2.5 mg Oral Q6H PRN    haloperidol lactate (HALDOL) injection 2.5 mg  2.5 mg IntraMUSCular Q6H PRN    benztropine (COGENTIN) tablet 0.5 mg  0.5 mg Oral BID PRN    diphenhydrAMINE (BENADRYL) injection 25 mg  25 mg IntraMUSCular BID PRN    acetaminophen (TYLENOL) tablet 650 mg  650 mg Oral Q4H PRN    magnesium hydroxide (MILK OF MAGNESIA) 400 mg/5 mL oral suspension 30 mL  30 mL Oral DAILY PRN    loperamide (IMODIUM) capsule 2 mg  2 mg Oral PRN    ibuprofen (MOTRIN) tablet 400 mg  400 mg Oral Q8H PRN    dicyclomine (BENTYL) 10 mg/mL injection 20 mg  20 mg IntraMUSCular Q7N PRN    folic acid (FOLVITE) tablet 1 mg  1 mg Oral DAILY    thiamine HCL (B-1) tablet 100 mg  100 mg Oral DAILY    cloNIDine HCL (CATAPRES) tablet 0.1 mg  0.1 mg Oral Q4H PRN    PHENobarbitaL (LUMINAL) tablet 32.4 mg  32.4 mg Oral QID    Followed by   [facility] PHENobarbitaL (LUMINAL) tablet 32.4 mg  32.4 mg Oral BID    Followed by   Marquez Smart ON 5/27/2021] PHENobarbitaL (LUMINAL) tablet 16.2 mg  16.2 mg Oral BID    PHENobarbitaL (LUMINAL) tablet 32.4 mg  32.4 mg Oral Q6H PRN    Followed by   Marquez Smart ON 5/27/2021] PHENobarbitaL (LUMINAL) tablet 16.2 mg  16.2 mg Oral Q6H PRN    therapeutic multivitamin (THERAGRAN) tablet 1 Tablet  1 Tablet Oral DAILY    aspirin delayed-release tablet 81 mg  81 mg Oral DAILY    atorvastatin (LIPITOR) tablet 40 mg  40 mg Oral DAILY    cholecalciferol (VITAMIN D3) (1000 Units /25 mcg) tablet 5,000 Units  5,000 Units Oral DAILY    clopidogreL (PLAVIX) tablet 75 mg  75 mg Oral DAILY    lisinopriL (PRINIVIL, ZESTRIL) tablet 40 mg  40 mg Oral DAILY    metFORMIN (GLUCOPHAGE) tablet 1,000 mg  1,000 mg Oral BID WITH MEALS    pantoprazole (PROTONIX) tablet 40 mg  40 mg Oral DAILY    traZODone (DESYREL) tablet 100 mg  100 mg Oral QHS    nicotine (NICODERM CQ) 14 mg/24 hr patch 1 Patch  1 Patch TransDERmal DAILY      SCHEDULED MEDICATIONS:   Current Facility-Administered Medications   Medication Dose Route Frequency    insulin lispro (HUMALOG) injection   SubCUTAneous TIDAC    sertraline (ZOLOFT) tablet 200 mg  200 mg Oral DAILY    perphenazine (TRILAFON) tablet tab 4 mg  4 mg Oral DAILY    folic acid (FOLVITE) tablet 1 mg  1 mg Oral DAILY    thiamine HCL (B-1) tablet 100 mg  100 mg Oral DAILY    PHENobarbitaL (LUMINAL) tablet 32.4 mg  32.4 mg Oral QID    Followed by   Ferguson PHENobarbitaL (LUMINAL) tablet 32.4 mg  32.4 mg Oral BID    Followed by   Domonique Abdalla ON 2021] PHENobarbitaL (LUMINAL) tablet 16.2 mg  16.2 mg Oral BID    therapeutic multivitamin (THERAGRAN) tablet 1 Tablet  1 Tablet Oral DAILY    aspirin delayed-release tablet 81 mg  81 mg Oral DAILY    atorvastatin (LIPITOR) tablet 40 mg  40 mg Oral DAILY    cholecalciferol (VITAMIN D3) (1000 Units /25 mcg) tablet 5,000 Units  5,000 Units Oral DAILY    clopidogreL (PLAVIX) tablet 75 mg  75 mg Oral DAILY    lisinopriL (PRINIVIL, ZESTRIL) tablet 40 mg  40 mg Oral DAILY    metFORMIN (GLUCOPHAGE) tablet 1,000 mg  1,000 mg Oral BID WITH MEALS    pantoprazole (PROTONIX) tablet 40 mg  40 mg Oral DAILY    traZODone (DESYREL) tablet 100 mg  100 mg Oral QHS    nicotine (NICODERM CQ) 14 mg/24 hr patch 1 Patch  1 Patch TransDERmal DAILY          ASSESSMENT & PLAN     DIAGNOSES REQUIRING ACTIVE TREATMENT AND MONITORING: (reviewed/updated 2021)  Patient Active Hospital Problem List:   Schizoaffective disorder    Assessment: patient with depression and alcohol abuse by history, also with history of AH though he appears in no distress presently. History most consistent with schizoaffective disorder; patient would benefit from psychosocial intervention to ensure he does not run out of medication in the future. He presently wants to detox from alcohol, he is precontemplative about going to rehab but drinks large amounts of EtOH regularly per history.     Plan:   - CONTINUE Zoloft 200 mg QDAY for MDD  - CONTINUE Trilafon 4 mg QDAY for AH  - F/u A1C, lipid panel  - DISCONTINUE Zyprexa due to polypharmacy  - Consider Naltrexone for EtOH use disorder  - EtOH precautions  -  Phenobarbital high dose taper (64 --> 16 mg)  - IGM therapy as tolerated  - Expand database / obtain collateral  - Dispo planning (reestablish services, medication pricing)     In summary, Salty Walters, is a 76 y.o.  male who presents with a severe exacerbation of the principal diagnosis of Schizoaffective disorder, depressive type ([facility] [address]    Patient's condition is improving. Patient requires continued inpatient hospitalization for further stabilization, safety monitoring and medication management. I will continue to coordinate the provision of individual, milieu, occupational, group, and substance abuse therapies to address target symptoms/diagnoses as deemed appropriate for the individual patient. A coordinated, multidisplinary treatment team round was conducted with the patient (this team consists of the nurse, psychiatric unit pharmacist,  and writer). Complete current electronic health record for patient has been reviewed today including consultant notes, ancillary staff notes, nurses and psychiatric tech notes. Suicide risk assessment completed and patient deemed to be of low risk for suicide at this time. The following regarding medications was addressed during rounds with patient:   the risks and benefits of the proposed medication. The patient was given the opportunity to ask questions. Informed consent given to the use of the above medications. Will continue to adjust psychiatric and non-psychiatric medications (see above \"medication\" section and orders section for details) as deemed appropriate & based upon diagnoses and response to treatment. I will continue to order blood tests/labs and diagnostic tests as deemed appropriate and review results as they become available (see orders for details and above listed lab/test results). I will order psychiatric records from previous [facility] hospitals to further elucidate the nature of patient's psychopathology and review once available.     I will gather additional collateral information from friends, family and o/p treatment team to further elucidate the nature of patient's psychopathology and baselline level of psychiatric functioning. I certify that this patient's inpatient psychiatric hospital services furnished since the previous certification were, and continue to be, required for treatment that could reasonably be expected to improve the patient's condition, or for diagnostic study, and that the patient continues to need, on a daily basis, active treatment furnished directly by or requiring the supervision of inpatient psychiatric facility personnel. In addition, the hospital records show that services furnished were intensive treatment services, admission or related services, or equivalent services.     EXPECTED DISCHARGE DATE/DAY: 5/27/21     DISPOSITION: Home       Signed By:   Glen Mariscal MD  5/26/2021

## 2021-05-26 NOTE — GROUP NOTE
SOHAIL  GROUP DOCUMENTATION INDIVIDUAL Group Therapy Note Date: 5/26/2021 Group Start Time: 1500 Group End Time: 7583 Group Topic: Recreational/Music Therapy [facility] [place] [address] ACUTE BEHAV [facility] [place], [address] GROUP DOCUMENTATION GROUP Group Therapy Note Attendees: 7 Attendance: Did not attend Patient's Goal: Interventions/techniquesNoemi Mann

## 2021-05-26 NOTE — BH NOTES
GROUP THERAPY PROGRESS NOTE    Patient is participating in Coping Skills group. Group time: 45 minutes     Personal goal for participation: Learn coping skills to improve mental health, reduce stress and work through uncomfortable emotions    Goal orientation: Personal    Group therapy participation: passive    Therapeutic interventions reviewed and discussed: Group discussion on ways patients are coping with mental health needs, stress and uncomfortable emotions. Discussion regarding alternative positive coping skills using each letter of the alphabet, resulting in patients having a list of 26+ positive coping skills to access. Impression of participation: Pt attended group late due to unknown reason. Pt presented with euthymic mood, clear speech, goal directed thought stream, calm and cooperative. Pt did not identify any positive coping skills but disclosed personal thoughts and feelings on other topics.     SERG Wesley

## 2021-05-26 NOTE — BH NOTES
GROUP THERAPY PROGRESS NOTE    Patient is participating in Discharge Planning Group. Group time: 40 minutes    Personal goal for participation: Process feelings related to discharge and/or feelings/goals for today. Goal orientation: Personal    Group therapy participation: active    Therapeutic interventions reviewed and discussed: Group discussion was focused on discharge plans and anxiety related to this. Group members discussed what they planned to do once discharge and discharge plans. Patients discussed their goals for today and what they are working on with treatment team to get ready for discharge. Impression of participation: Brenda Espinoza was present in group. He was joking with the group and surprised this writer knew his name. He was alert and oriented and shared that he met his treatment team yesterday and pointed to his nurse. He shared that he is looking forward to talking to his treatment team again today. He was called out by staff and did not return before group ended.     Guillermo Dwyer [facility]

## 2021-05-26 NOTE — GROUP NOTE
SOHAIL  GROUP DOCUMENTATION INDIVIDUAL Group Therapy Note Date: 5/26/2021 Group Start Time: 1100 Group End Time: 1200 Group Topic: Topic Group [facility] 3 ACUTE BEHAV [facility] [place], [address] [facility] GROUP DOCUMENTATION GROUP Group Therapy Note Attendees: 7 Attendance: Did not attend Patient's Goal: Interventions/techniques:Noemi Mann

## 2021-05-26 NOTE — BH NOTES
GROUP THERAPY PROGRESS NOTE    Patient is participating in Coping Skills Group. Group time: 1 hour    Personal goal for participation: Process changes they desire to make and set goals to make these changes     Goal orientation: Personal    Group therapy participation: active    Therapeutic interventions reviewed and discussed: Group discussion was focused on changes patients desire to make in their lives. Group members discussed what changes they desire to make, how to effectively make these changes, and the benefits of making these changes. Patients ranked changes in order of importance and set goals to make these changes in their lives. Impression of participation: Pt presented with euthymic mood, full affect, clear speech, goal directed thought stream. Pt has poor insight and judgement, pt processed desire to stop smoking but then declined every tactic to quit he was presented with. Psychoeducation provided on readiness for change. Pt processed how his smoking habit increases his drinking and THC use.      SERG Workman

## 2021-05-27 VITALS
BODY MASS INDEX: 33.05 KG/M2 | HEART RATE: 64 BPM | TEMPERATURE: 98.2 F | OXYGEN SATURATION: 96 % | RESPIRATION RATE: 18 BRPM | DIASTOLIC BLOOD PRESSURE: 89 MMHG | HEIGHT: 72 IN | SYSTOLIC BLOOD PRESSURE: 133 MMHG | WEIGHT: 244 LBS

## 2021-05-27 LAB
GLUCOSE BLD STRIP.AUTO-MCNC: 231 MG/DL (ref 65–117)
GLUCOSE BLD STRIP.AUTO-MCNC: 240 MG/DL (ref 65–117)
GLUCOSE BLD STRIP.AUTO-MCNC: 299 MG/DL (ref 65–117)
GLUCOSE BLD STRIP.AUTO-MCNC: 306 MG/DL (ref 65–117)
SERVICE CMNT-IMP: ABNORMAL

## 2021-05-27 PROCEDURE — 74011636637 HC RX REV CODE- 636/637: Performed by: PSYCHIATRY & NEUROLOGY

## 2021-05-27 PROCEDURE — 74011250637 HC RX REV CODE- 250/637: Performed by: PSYCHIATRY & NEUROLOGY

## 2021-05-27 PROCEDURE — 82962 GLUCOSE BLOOD TEST: CPT

## 2021-05-27 PROCEDURE — 74011250637 HC RX REV CODE- 250/637: Performed by: EMERGENCY MEDICINE

## 2021-05-27 PROCEDURE — 65220000003 HC RM SEMIPRIVATE PSYCH

## 2021-05-27 PROCEDURE — 99232 SBSQ HOSP IP/OBS MODERATE 35: CPT | Performed by: PSYCHIATRY & NEUROLOGY

## 2021-05-27 RX ORDER — INSULIN GLARGINE 100 [IU]/ML
15 INJECTION, SOLUTION SUBCUTANEOUS
Status: DISCONTINUED | OUTPATIENT
Start: 2021-05-27 | End: 2021-05-28 | Stop reason: HOSPADM

## 2021-05-27 RX ORDER — NALTREXONE HYDROCHLORIDE 50 MG/1
50 TABLET, FILM COATED ORAL DAILY
Status: DISCONTINUED | OUTPATIENT
Start: 2021-05-27 | End: 2021-05-28 | Stop reason: HOSPADM

## 2021-05-27 RX ADMIN — PANTOPRAZOLE SODIUM 40 MG: 40 TABLET, DELAYED RELEASE ORAL at 08:08

## 2021-05-27 RX ADMIN — INSULIN LISPRO 5 UNITS: 100 INJECTION, SOLUTION INTRAVENOUS; SUBCUTANEOUS at 16:18

## 2021-05-27 RX ADMIN — METFORMIN HYDROCHLORIDE 1000 MG: 500 TABLET ORAL at 08:08

## 2021-05-27 RX ADMIN — Medication 100 MG: at 08:08

## 2021-05-27 RX ADMIN — PHENOBARBITAL 16.2 MG: 32.4 TABLET ORAL at 16:17

## 2021-05-27 RX ADMIN — PHENOBARBITAL 32.4 MG: 32.4 TABLET ORAL at 08:10

## 2021-05-27 RX ADMIN — INSULIN LISPRO 7 UNITS: 100 INJECTION, SOLUTION INTRAVENOUS; SUBCUTANEOUS at 11:33

## 2021-05-27 RX ADMIN — INSULIN LISPRO 3 UNITS: 100 INJECTION, SOLUTION INTRAVENOUS; SUBCUTANEOUS at 08:03

## 2021-05-27 RX ADMIN — TRAZODONE HYDROCHLORIDE 100 MG: 100 TABLET ORAL at 21:23

## 2021-05-27 RX ADMIN — LISINOPRIL 40 MG: 20 TABLET ORAL at 08:08

## 2021-05-27 RX ADMIN — FOLIC ACID 1 MG: 1 TABLET ORAL at 08:09

## 2021-05-27 RX ADMIN — Medication 5000 UNITS: at 08:09

## 2021-05-27 RX ADMIN — METFORMIN HYDROCHLORIDE 1000 MG: 500 TABLET ORAL at 16:18

## 2021-05-27 RX ADMIN — PERPHENAZINE 4 MG: 4 TABLET, FILM COATED ORAL at 08:08

## 2021-05-27 RX ADMIN — NALTREXONE HYDROCHLORIDE 50 MG: 50 TABLET, FILM COATED ORAL at 11:56

## 2021-05-27 RX ADMIN — ATORVASTATIN CALCIUM 40 MG: 40 TABLET, FILM COATED ORAL at 08:08

## 2021-05-27 RX ADMIN — INSULIN GLARGINE 15 UNITS: 100 INJECTION, SOLUTION SUBCUTANEOUS at 21:23

## 2021-05-27 RX ADMIN — THERA TABS 1 TABLET: TAB at 08:08

## 2021-05-27 RX ADMIN — CLOPIDOGREL BISULFATE 75 MG: 75 TABLET, FILM COATED ORAL at 08:08

## 2021-05-27 RX ADMIN — SERTRALINE 200 MG: 50 TABLET, FILM COATED ORAL at 08:08

## 2021-05-27 RX ADMIN — ASPIRIN 81 MG: 81 TABLET, COATED ORAL at 08:08

## 2021-05-27 NOTE — PROGRESS NOTES
2000: Assumed care of patient after receiving shift report from outgoing nurse. Patient is out and visible on unit, interacting with peers and staff. Pt makes good eye contact. A&O x 4. Hoping for discharge tomorrow. CIWA is 0.     2300: Affect is blunted, mood is generally pleasant. Hygiene is adequate, independent in ADLs. Gait is steady. Sleep and appetite patterns WNL per patient. Denies SI/HI and demonstrates no evidence of intent to harm self or others. Denies hallucinations at present. Pt encouraged to continue to participate in care. Will continue to monitor pt with q 15 min checks. 0615: Pt has been observed throughout the night sleeping in bed with even respirations. Total hours slept approximately  7 - 8. No s/s of distress noted. Patient has remained safe. Will continue to monitor.      Problem: Depressed Mood (Adult/Pediatric)  Goal: *STG: Verbalizes anger, guilt, and other feelings in a constructive manor  Outcome: Progressing Towards Goal

## 2021-05-27 NOTE — PROGRESS NOTES
[id number] Report received from [address] [place] [address] Patient presented to medication window for scheduled medications. Patient is calm and cooperative with assessment. AAOX4. Speech clear. Resp. Even and unlabored. NAD noted. Skin WNL for race. Ambulates with steady gait without the use of assistive devices. Patient appears to be in a good mood this morning. Patient denies SI/HI, AVH, anxiety, depression, and pain. No needs, wants, or concerns voiced at this time. NO s/sx of hypo/hyperglycemic reaction at this time. Medicated with scheduled SSC as ordered for blood glucose of 231. Will continue to monitor. 0945 Patient laying in bed with eyes closed. NAD noted. 1032 Patient talking on the phone in the dayroom. NAD noted. 1123 Patient walking in the hallway. NAD noted. 1213 Patient sitting in the dayroom eating lunch and watching TV. NAD noted. [phone] Patient laying in bed with eyes closed. NAD noted. 1550  Patient sitting in the dayroom participating in group. NAD noted. 1647 Patient standing in the hallway waiting on his dinner tray. NAD noted. [phone] Patient laying in bed with eyes closed. NAD noted. 1842 Patient laying in bed facing in the window. NAD noted. Problem: Falls - Risk of  Goal: *Absence of Falls  Description: Document Patrice Heaton Fall Risk and appropriate interventions in the flowsheet. Outcome: Progressing Towards Goal  Note: Fall Risk Interventions:   Medication Interventions: Teach patient to arise slowly  Problem: Depressed Mood (Adult/Pediatric)  Goal: *STG: Participates in treatment plan  Outcome: Progressing Towards Goal  Goal: *STG: Remains safe in hospital  Outcome: Progressing Towards Goal  Goal: *STG: Complies with medication therapy  Outcome: Progressing Towards Goal     Problem: Diabetes Self-Management  Goal: *Disease process and treatment process  Description: Define diabetes and identify own type of diabetes; list 3 options for treating diabetes.   Outcome: Progressing Towards Goal  Goal: *Incorporating nutritional management into lifestyle  Description: Describe effect of type, amount and timing of food on blood glucose; list 3 methods for planning meals. Outcome: Progressing Towards Goal  Goal: *Using medications safely  Description: State effect of diabetes medications on diabetes; name diabetes medication taking, action and side effects. Outcome: Progressing Towards Goal  Goal: *Monitoring blood glucose, interpreting and using results  Description: Identify recommended blood glucose targets  and personal targets.   Outcome: Progressing Towards Goal

## 2021-05-27 NOTE — GROUP NOTE
SOHAIL  GROUP DOCUMENTATION INDIVIDUAL Group Therapy Note Date: 5/27/2021 Group Start Time: 1500 Group End Time: 8012 Group Topic: Recreational/Music Therapy [address] [facility] 3 ACUTE BEHAV [facility] [place], [address] GROUP DOCUMENTATION GROUP Group Therapy Note Attendees: 11 Attendance: Attended Patient's Goal:  To concentrate on selected task Interventions/techniques: Supported-crafts,games,music Follows Directions: Followed directions Interactions: Interacted appropriately Mental Status: Calm Behavior/appearance: Attentive, Cooperative and Needed prompting Goals Achieved: Able to engage in interactions and Able to listen to others Additional Notes: Active participant in game Vanessa Garciajames

## 2021-05-27 NOTE — BH NOTES
PSYCHIATRIC PROGRESS NOTE         Patient Name  Zach Jordan   Date of Birth 1955   [facility] [id number]   Medical Record Number  [id number]      Age  76 y.o. PCP Skiff, Yvonnie Fail, NP   Admit date:  5/23/2021    Room Number  327/02  @ [facility]   Date of Service  5/27/2021         E & M PROGRESS NOTE:         HISTORY       CC:  \"suicidal ideation / EtOH detox\"  HISTORY OF PRESENT ILLNESS/INTERVAL HISTORY:  (reviewed/updated 5/27/2021). per initial evaluation: The patient, Zach Jordan, is a 76 y.o. WHITE male with a past psychiatric history significant for MDD and EtOH use disorder, who presents at this time with complaints of (and/or evidence of) the following emotional symptoms: depression and suicidal thoughts/threats. Additional symptomatology include escalation of EtOH abuse. The above symptoms have been present for 2+ months. These symptoms are of moderate to high severity. These symptoms are constant in nature. The patient's condition has been precipitated by psychosocial stressors. Patient's condition made worse by alcohol use as well as treatment noncompliance. UDS: negative; BAL=+154 on admission.      The patient is a fair historian. The patient corroborates the above narrative. The patient contracts for safety on the unit and gives consent for the team to contact collateral. The patient is amenable to initiating treatment while on the unit. The patient acknowledges high SSRI but states he would prefer to stay on the same dose despite a long period of not taking it. He denies having schizophrenia but then acknowledges hearing voices. He is open to streamlining his regimen, specifically he is taking a second antipsychotic with known instance of metabolic changes that may worsen his obesity and IDDM. Per pharmacist, patient was supposed to be on Lantus but stopped along with other medications when his insurance changed.  He reports a recent stroke vs TIA with some residual L sided weakness. The patient declines rehab placement but states he is open to taking Naltrexone. 5/26 - patient slept 8 hours overnight. He is calm and cooperative, allowed blood draw this morning, thus far TSH WNL. Patient has been visible, able to make his needs known. He denies SI/HI/AVH/PI. Discussed schizophrenia as patient has no charted history of psychotic illness. He states that he in fact was diagnosed with schizophrenia in the past and this is why he is taking Trilafon. Patient's wife confirmed the regimen. Patient has been otherwise in good behavioral control. 5/27 - patient doing well; he slept 6 hours overnight, denies SI/HI/AVH/PI. Patient with HbA1C of 10.3, will be starting insulin long acting regimen this evening. Patient voiced understanding of this development as well as the need for him to remain in the hospital overnight. Patient briefed on naltrexone for EtOH use disorder, is amenable to starting this medication. SIDE EFFECTS: (reviewed/updated 5/27/2021)  None reported or admitted to. ALLERGIES:(reviewed/updated 5/27/2021)  Allergies   Allergen Reactions    Egg Swelling      MEDICATIONS PRIOR TO ADMISSION:(reviewed/updated 5/27/2021)  Medications Prior to Admission   Medication Sig    perphenazine (TRILAFON) 8 mg tablet Take 8 mg by mouth daily. Indications: bipolar 2 disorder    aspirin delayed-release 81 mg tablet Take 81 mg by mouth daily. Indications: prevention of transient ischemic attack    clopidogrel (PLAVIX) 75 mg tab Take 75 mg by mouth daily. Indications: prevention for a blood clot going to the brain    metFORMIN (GLUCOPHAGE) 1,000 mg tablet Take 1,000 mg by mouth two (2) times daily (with meals). Indications: type 2 diabetes mellitus    pantoprazole (PROTONIX) 40 mg tablet Take 40 mg by mouth daily. Indications: gastroesophageal reflux disease    sertraline (ZOLOFT) 100 mg tablet Take 200 mg by mouth daily.  Indications: major depressive disorder    traZODone (DESYREL) 100 mg tablet Take 100 mg by mouth nightly. Indications: insomnia    cholecalciferol, VITAMIN D3, (VITAMIN D3) 5,000 unit tab tablet Take 5,000 Units by mouth daily. Indications: prevention of vitamin D deficiency    atorvastatin (LIPITOR) 40 mg tablet Take 40 mg by mouth daily.  OLANZapine (ZyPREXA) 2.5 mg tablet Take 2.5 mg by mouth nightly.  lisinopril (PRINIVIL, ZESTRIL) 40 mg tablet Take 40 mg by mouth daily. Indications: high blood pressure      PAST MEDICAL HISTORY: Past medical history from the initial psychiatric evaluation has been reviewed (reviewed/updated 5/27/2021) with no additional updates (I asked patient and no additional past medical history provided). Past Medical History:   Diagnosis Date    Bipolar 2 disorder ([facility] [address]     Depression     DM (diabetes mellitus) ([facility]     ETOH abuse     HTN (hypertension)     Stroke [facility])      Past Surgical History:   Procedure Laterality Date    HX HERNIA REPAIR        SOCIAL HISTORY: Social history from the initial psychiatric evaluation has been reviewed (reviewed/updated 5/27/2021) with no additional updates (I asked patient and no additional social history provided).    Social History     Socioeconomic History    Marital status:      Spouse name: Not on file    Number of children: Not on file    Years of education: Not on file    Highest education level: Not on file   Occupational History    Not on file   Tobacco Use    Smoking status: Current Every Day Smoker     Packs/day: 1.00    Smokeless tobacco: Never Used   Substance and Sexual Activity    Alcohol use: Yes     Comment: drinks a fifth of wehiskey and a case a beer per day    Drug use: No    Sexual activity: Not on file   Other Topics Concern    Not on file   Social History Narrative    Not on file     Social Determinants of Health     Financial Resource Strain:     Difficulty of Paying Living Expenses:    Food Insecurity:     Worried About Running Out of [place] in the Last Year:    [address] [facility]:    Transportation Needs:     Lack of Transportation (Medical):  Lack of Transportation (Non-Medical):    Physical Activity:     Days of Exercise per Week:     Minutes of Exercise per Session:    Stress:     Feeling of Stress :    Social Connections:     Frequency of Communication with Friends and Family:     Frequency of Social Gatherings with Friends and Family:     Attends [employer] Services:     Active Member of Clubs or Organizations:     Attends Club or Organization Meetings:     Marital Status:    Intimate Partner Violence:     Fear of Current or Ex-Partner:     Emotionally Abused:     Physically Abused:     Sexually Abused:       FAMILY HISTORY: Family history from the initial psychiatric evaluation has been reviewed (reviewed/updated 5/27/2021) with no additional updates (I asked patient and no additional family history provided). History reviewed. No pertinent family history. REVIEW OF SYSTEMS: (reviewed/updated 5/27/2021)  Appetite:no change from normal   Sleep: no change   All other Review of Systems: Negative except per HPI         [facility] (MSE):    MSE FINDINGS ARE WITHIN NORMAL LIMITS (WNL) UNLESS OTHERWISE STATED BELOW. ( ALL OF THE BELOW CATEGORIES OF THE MSE HAVE BEEN REVIEWED (reviewed 5/27/2021) AND UPDATED AS DEEMED APPROPRIATE )  General Presentation age appropriate, guarded   Orientation oriented to time, place and person   Vital Signs  See below (reviewed 5/27/2021); Vital Signs (BP, Pulse, & Temp) are within normal limits if not listed below.    Gait and Station Stable/steady, no ataxia   Musculoskeletal System No extrapyramidal symptoms (EPS); no abnormal muscular movements or Tardive Dyskinesia (TD); muscle strength and tone are within normal limits   Language No aphasia or dysarthria   Speech:  normal volume and non-pressured   Thought Processes coherent; normal rate of thoughts; fair abstract reasoning/computation   Thought Associations goal directed   Thought Content preoccupations   Suicidal Ideations none   Homicidal Ideations none   Mood:  euthymic   Affect:  mood-congruent   Memory recent  intact   Memory remote:  intact   Concentration/Attention:  intact   Fund of Knowledge average   Insight:  limited   Reliability fair   Judgment:  fair          VITALS:     Patient Vitals for the past 24 hrs:   Temp Pulse Resp BP SpO2   05/27/21 0757 98.3 °F (36.8 °C) 60 18 128/69 97 %   05/26/21 2036 98.5 °F (36.9 °C) (!) 54 16 131/89 97 %   05/26/21 1643 98.9 °F (37.2 °C) (!) 55 16 125/70 97 %     Wt Readings from Last 3 Encounters:   05/23/21 110.7 kg (244 lb)   08/08/17 109.8 kg (242 lb)   10/23/16 109.3 kg (241 lb)     Temp Readings from Last 3 Encounters:   05/27/21 98.3 °F (36.8 °C)   08/08/17 97.6 °F (36.4 °C) (Oral)   10/23/16 97.5 °F (36.4 °C)     BP Readings from Last 3 Encounters:   05/27/21 128/69   08/08/17 154/79   10/23/16 154/86     Pulse Readings from Last 3 Encounters:   05/27/21 60   08/08/17 (!) 52   10/23/16 (!) 52            DATA     LABORATORY DATA:(reviewed/updated 5/27/2021)  Recent Results (from the past 24 hour(s))   GLUCOSE, POC    Collection Time: 05/26/21  8:14 PM   Result Value Ref Range    Glucose (POC) 197 (H) 65 - 117 mg/dL    Performed by Alexandro Pickett    GLUCOSE, POC    Collection Time: 05/27/21  7:59 AM   Result Value Ref Range    Glucose (POC) 231 (H) 65 - 117 mg/dL    Performed by Benita Loredo RN    GLUCOSE, POC    Collection Time: 05/27/21 11:29 AM   Result Value Ref Range    Glucose (POC) 306 (H) 65 - 117 mg/dL    Performed by Benita Loredo RN    GLUCOSE, POC    Collection Time: 05/27/21  4:12 PM   Result Value Ref Range    Glucose (POC) 299 (H) 65 - 117 mg/dL    Performed by Benita Loredo RN      No results found for: VALF2, VALAC, VALP, VALPR, DS6, CRBAM, CRBAMP, CARB2, XCRBAM  No results found for: LITHM   RADIOLOGY REPORTS:(reviewed/updated 5/27/2021)  No results found.        MEDICATIONS     ALL MEDICATIONS:   Current Facility-Administered Medications   Medication Dose Route Frequency    insulin glargine (LANTUS) injection 15 Units  15 Units SubCUTAneous QHS    naltrexone (DEPADE) tablet 50 mg  50 mg Oral DAILY    insulin lispro (HUMALOG) injection   SubCUTAneous TIDAC    glucose chewable tablet 16 g  4 Tablet Oral PRN    dextrose (D50W) injection syrg 12.5-25 g  12.5-25 g IntraVENous PRN    glucagon (GLUCAGEN) injection 1 mg  1 mg IntraMUSCular PRN    sertraline (ZOLOFT) tablet 200 mg  200 mg Oral DAILY    perphenazine (TRILAFON) tablet tab 4 mg  4 mg Oral DAILY    OLANZapine (ZyPREXA) tablet 2.5 mg  2.5 mg Oral Q6H PRN    haloperidol lactate (HALDOL) injection 2.5 mg  2.5 mg IntraMUSCular Q6H PRN    benztropine (COGENTIN) tablet 0.5 mg  0.5 mg Oral BID PRN    diphenhydrAMINE (BENADRYL) injection 25 mg  25 mg IntraMUSCular BID PRN    acetaminophen (TYLENOL) tablet 650 mg  650 mg Oral Q4H PRN    magnesium hydroxide (MILK OF MAGNESIA) 400 mg/5 mL oral suspension 30 mL  30 mL Oral DAILY PRN    loperamide (IMODIUM) capsule 2 mg  2 mg Oral PRN    ibuprofen (MOTRIN) tablet 400 mg  400 mg Oral Q8H PRN    dicyclomine (BENTYL) 10 mg/mL injection 20 mg  20 mg IntraMUSCular L0G PRN    folic acid (FOLVITE) tablet 1 mg  1 mg Oral DAILY    thiamine HCL (B-1) tablet 100 mg  100 mg Oral DAILY    cloNIDine HCL (CATAPRES) tablet 0.1 mg  0.1 mg Oral Q4H PRN    PHENobarbitaL (LUMINAL) tablet 16.2 mg  16.2 mg Oral BID    PHENobarbitaL (LUMINAL) tablet 32.4 mg  32.4 mg Oral Q6H PRN    Followed by   Duana Hark PHENobarbitaL (LUMINAL) tablet 16.2 mg  16.2 mg Oral Q6H PRN    therapeutic multivitamin (THERAGRAN) tablet 1 Tablet  1 Tablet Oral DAILY    aspirin delayed-release tablet 81 mg  81 mg Oral DAILY    atorvastatin (LIPITOR) tablet 40 mg  40 mg Oral DAILY    cholecalciferol (VITAMIN D3) (1000 Units /25 mcg) tablet 5,000 Units 5,000 Units Oral DAILY    clopidogreL (PLAVIX) tablet 75 mg  75 mg Oral DAILY    lisinopriL (PRINIVIL, ZESTRIL) tablet 40 mg  40 mg Oral DAILY    metFORMIN (GLUCOPHAGE) tablet 1,000 mg  1,000 mg Oral BID WITH MEALS    pantoprazole (PROTONIX) tablet 40 mg  40 mg Oral DAILY    traZODone (DESYREL) tablet 100 mg  100 mg Oral QHS    nicotine (NICODERM CQ) 14 mg/24 hr patch 1 Patch  1 Patch TransDERmal DAILY      SCHEDULED MEDICATIONS:   Current Facility-Administered Medications   Medication Dose Route Frequency    insulin glargine (LANTUS) injection 15 Units  15 Units SubCUTAneous QHS    naltrexone (DEPADE) tablet 50 mg  50 mg Oral DAILY    insulin lispro (HUMALOG) injection   SubCUTAneous TIDAC    sertraline (ZOLOFT) tablet 200 mg  200 mg Oral DAILY    perphenazine (TRILAFON) tablet tab 4 mg  4 mg Oral DAILY    folic acid (FOLVITE) tablet 1 mg  1 mg Oral DAILY    thiamine HCL (B-1) tablet 100 mg  100 mg Oral DAILY    PHENobarbitaL (LUMINAL) tablet 16.2 mg  16.2 mg Oral BID    therapeutic multivitamin (THERAGRAN) tablet 1 Tablet  1 Tablet Oral DAILY    aspirin delayed-release tablet 81 mg  81 mg Oral DAILY    atorvastatin (LIPITOR) tablet 40 mg  40 mg Oral DAILY    cholecalciferol (VITAMIN D3) (1000 Units /25 mcg) tablet 5,000 Units  5,000 Units Oral DAILY    clopidogreL (PLAVIX) tablet 75 mg  75 mg Oral DAILY    lisinopriL (PRINIVIL, ZESTRIL) tablet 40 mg  40 mg Oral DAILY    metFORMIN (GLUCOPHAGE) tablet 1,000 mg  1,000 mg Oral BID WITH MEALS    pantoprazole (PROTONIX) tablet 40 mg  40 mg Oral DAILY    traZODone (DESYREL) tablet 100 mg  100 mg Oral QHS    nicotine (NICODERM CQ) 14 mg/24 hr patch 1 Patch  1 Patch TransDERmal DAILY          ASSESSMENT & PLAN     DIAGNOSES REQUIRING ACTIVE TREATMENT AND MONITORING: (reviewed/updated 5/27/2021)  Patient Active Hospital Problem List:   Schizoaffective disorder    Assessment: patient with depression and alcohol abuse by history, also with history of AH though he appears in no distress presently. History most consistent with schizoaffective disorder; patient would benefit from psychosocial intervention to ensure he does not run out of medication in the future. He presently wants to detox from alcohol, he is precontemplative about going to rehab but drinks large amounts of EtOH regularly per history. Plan:   - CONTINUE Zoloft 200 mg QDAY for MDD  - CONTINUE Trilafon 4 mg QDAY for AH  - HbA1C 10.3  - START Lantus 15U QHS for IDDM  - DISCONTINUE Zyprexa due to polypharmacy  - START Naltrexone for 50 mg QDAY for EtOH use disorder  - EtOH precautions  -  Phenobarbital high dose taper (64 --> 16 mg)  - IGM therapy as tolerated  - Expand database / obtain collateral  - Dispo planning (reestablish services, medication pricing)     In summary, Bobo Atkinson, is a 76 y.o.  male who presents with a severe exacerbation of the principal diagnosis of Schizoaffective disorder, depressive type ([facility] [address]    Patient's condition is improving. Patient requires continued inpatient hospitalization for further stabilization, safety monitoring and medication management. I will continue to coordinate the provision of individual, milieu, occupational, group, and substance abuse therapies to address target symptoms/diagnoses as deemed appropriate for the individual patient. A coordinated, multidisplinary treatment team round was conducted with the patient (this team consists of the nurse, psychiatric unit pharmacist,  and writer). Complete current electronic health record for patient has been reviewed today including consultant notes, ancillary staff notes, nurses and psychiatric tech notes. Suicide risk assessment completed and patient deemed to be of low risk for suicide at this time. The following regarding medications was addressed during rounds with patient:   the risks and benefits of the proposed medication.  The patient was given the opportunity to ask questions. Informed consent given to the use of the above medications. Will continue to adjust psychiatric and non-psychiatric medications (see above \"medication\" section and orders section for details) as deemed appropriate & based upon diagnoses and response to treatment. I will continue to order blood tests/labs and diagnostic tests as deemed appropriate and review results as they become available (see orders for details and above listed lab/test results). I will order psychiatric records from previous [facility] hospitals to further elucidate the nature of patient's psychopathology and review once available. I will gather additional collateral information from friends, family and o/p treatment team to further elucidate the nature of patient's psychopathology and baselline level of psychiatric functioning. I certify that this patient's inpatient psychiatric hospital services furnished since the previous certification were, and continue to be, required for treatment that could reasonably be expected to improve the patient's condition, or for diagnostic study, and that the patient continues to need, on a daily basis, active treatment furnished directly by or requiring the supervision of inpatient psychiatric facility personnel. In addition, the hospital records show that services furnished were intensive treatment services, admission or related services, or equivalent services.     EXPECTED DISCHARGE DATE/DAY: 5/28/21     DISPOSITION: Home       Signed By:   Gayle Oro MD  5/27/2021

## 2021-05-27 NOTE — GROUP NOTE
SOHAIL  GROUP DOCUMENTATION INDIVIDUAL Group Therapy Note Date: 5/27/2021 Group Start Time: 0900 Group End Time: 1000 Group Topic: Topic Group [facility] 3 ACUTE BEHAV [facility] [place], [address] GROUP DOCUMENTATION GROUP Group Therapy Note Attendees: 9 Attendance: Attended Patient's Goal:  To participate in stress management [employer] Group game Interventions/techniques: Supported-things pertaining to stress Interactions: Interacted appropriately Mental Status: Calm Behavior/appearance: Needed prompting Goals Achieved: Able to engage in interactions and Able to listen to others Additional Notes:  Pleasant,cooperative-pt arrived late Osmel Martin

## 2021-05-27 NOTE — PROGRESS NOTES
CC diet as tolerated. Pt meal compliant. Two thousand kcal meal trays ordered to better meet ~ needs. Hx notable for T2DM with neuropathy, A1c 10.3, HTN, substance abuse/dependence. Ht: 6'  Wt: 244 lb  BMI: 33.09 kg/(m^2) c/w obesity grade I  Est energy needs: 2125 kcal, 75 g protein, 2300 mL fluids  Pt will consume > 75% of meals at follow up 7-10 days; education for DM when pt amenable.

## 2021-05-27 NOTE — GROUP NOTE
SOHAIL  GROUP DOCUMENTATION INDIVIDUAL Group Therapy Note Date: 5/27/2021 Group Start Time: 1100 Group End Time: 1200 Group Topic: Topic Group [address] 3 ACUTE BEHAV [facility] [place], [address] [facility] GROUP DOCUMENTATION GROUP Group Therapy Note Attendees: 10 Attendance: Did not attend Patient's Goal: Interventions/techniques:Noemi Mann

## 2021-05-27 NOTE — BH NOTES
Behavioral Health Treatment Team Note         Patient goal(s) for today: Attend groups   Treatment team focus/goals: Dispo planning   Progress note: Pt was seen in treatment team this morning. Pt is alert and oriented. Pt denies SI/HI. Pt's mood is euthymic, affect is WNL Pt's thought process is logical.  Pt's insight and judgment is fair, reliability is good. Social work department will continue to coordinate discharge plans.      LOS:  3                        Expected LOS: 5/28     Financial concerns/prescription coverage:  BLUE CROSS MEDICARE  Date of last family contact: None                                  Family requesting physician contact today:  N/A  Discharge plan: Home  Guns in the home: None involved. Outpatient provider(s):  To be linked.      Participating treatment team members: SERG Hung and Dr. Shann Claude, MD

## 2021-05-28 LAB
GLUCOSE BLD STRIP.AUTO-MCNC: 227 MG/DL (ref 65–117)
GLUCOSE BLD STRIP.AUTO-MCNC: 313 MG/DL (ref 65–117)
SERVICE CMNT-IMP: ABNORMAL
SERVICE CMNT-IMP: ABNORMAL

## 2021-05-28 PROCEDURE — 82962 GLUCOSE BLOOD TEST: CPT

## 2021-05-28 PROCEDURE — 99239 HOSP IP/OBS DSCHRG MGMT >30: CPT | Performed by: PSYCHIATRY & NEUROLOGY

## 2021-05-28 PROCEDURE — 74011250637 HC RX REV CODE- 250/637: Performed by: PSYCHIATRY & NEUROLOGY

## 2021-05-28 PROCEDURE — 74011250637 HC RX REV CODE- 250/637: Performed by: EMERGENCY MEDICINE

## 2021-05-28 PROCEDURE — 74011636637 HC RX REV CODE- 636/637: Performed by: PSYCHIATRY & NEUROLOGY

## 2021-05-28 RX ORDER — TRAZODONE HYDROCHLORIDE 100 MG/1
100 TABLET ORAL
Qty: 30 TABLET | Refills: 1 | Status: SHIPPED | OUTPATIENT
Start: 2021-05-28

## 2021-05-28 RX ORDER — ATORVASTATIN CALCIUM 40 MG/1
40 TABLET, FILM COATED ORAL DAILY
Qty: 30 TABLET | Refills: 1 | Status: ON HOLD | OUTPATIENT
Start: 2021-05-28 | End: 2022-02-22

## 2021-05-28 RX ORDER — LISINOPRIL 40 MG/1
40 TABLET ORAL DAILY
Qty: 30 TABLET | Refills: 1 | Status: ON HOLD | OUTPATIENT
Start: 2021-05-28 | End: 2022-02-22

## 2021-05-28 RX ORDER — ASPIRIN 81 MG/1
81 TABLET ORAL DAILY
Qty: 30 TABLET | Refills: 1 | Status: SHIPPED | OUTPATIENT
Start: 2021-05-28

## 2021-05-28 RX ORDER — PANTOPRAZOLE SODIUM 40 MG/1
40 TABLET, DELAYED RELEASE ORAL DAILY
Qty: 30 TABLET | Refills: 1 | Status: SHIPPED | OUTPATIENT
Start: 2021-05-28

## 2021-05-28 RX ORDER — NALTREXONE HYDROCHLORIDE 50 MG/1
50 TABLET, FILM COATED ORAL DAILY
Qty: 30 TABLET | Refills: 1 | Status: ON HOLD | OUTPATIENT
Start: 2021-05-29 | End: 2022-02-22

## 2021-05-28 RX ORDER — INSULIN GLARGINE 100 [IU]/ML
15 INJECTION, SOLUTION SUBCUTANEOUS
Qty: 1 VIAL | Refills: 0 | Status: ON HOLD | OUTPATIENT
Start: 2021-05-28 | End: 2022-02-22

## 2021-05-28 RX ORDER — CHOLECALCIFEROL TAB 125 MCG (5000 UNIT) 125 MCG
5000 TAB ORAL DAILY
Qty: 30 TABLET | Refills: 1 | Status: SHIPPED | OUTPATIENT
Start: 2021-05-28

## 2021-05-28 RX ORDER — PERPHENAZINE 4 MG/1
4 TABLET, FILM COATED ORAL DAILY
Qty: 30 TABLET | Refills: 1 | Status: ON HOLD | OUTPATIENT
Start: 2021-05-29 | End: 2022-02-22

## 2021-05-28 RX ORDER — CLOPIDOGREL BISULFATE 75 MG/1
75 TABLET ORAL DAILY
Qty: 30 TABLET | Refills: 1 | Status: SHIPPED | OUTPATIENT
Start: 2021-05-28

## 2021-05-28 RX ORDER — SERTRALINE HYDROCHLORIDE 100 MG/1
200 TABLET, FILM COATED ORAL DAILY
Qty: 30 TABLET | Refills: 1 | Status: ON HOLD | OUTPATIENT
Start: 2021-05-28 | End: 2022-02-22

## 2021-05-28 RX ORDER — METFORMIN HYDROCHLORIDE 1000 MG/1
1000 TABLET ORAL 2 TIMES DAILY WITH MEALS
Qty: 60 TABLET | Refills: 1 | Status: ON HOLD | OUTPATIENT
Start: 2021-05-28 | End: 2022-02-22

## 2021-05-28 RX ADMIN — LISINOPRIL 40 MG: 20 TABLET ORAL at 08:49

## 2021-05-28 RX ADMIN — PERPHENAZINE 4 MG: 4 TABLET, FILM COATED ORAL at 08:50

## 2021-05-28 RX ADMIN — ASPIRIN 81 MG: 81 TABLET, COATED ORAL at 08:50

## 2021-05-28 RX ADMIN — Medication 5000 UNITS: at 08:50

## 2021-05-28 RX ADMIN — INSULIN LISPRO 3 UNITS: 100 INJECTION, SOLUTION INTRAVENOUS; SUBCUTANEOUS at 08:49

## 2021-05-28 RX ADMIN — METFORMIN HYDROCHLORIDE 1000 MG: 500 TABLET ORAL at 08:50

## 2021-05-28 RX ADMIN — PHENOBARBITAL 16.2 MG: 32.4 TABLET ORAL at 08:51

## 2021-05-28 RX ADMIN — PANTOPRAZOLE SODIUM 40 MG: 40 TABLET, DELAYED RELEASE ORAL at 08:50

## 2021-05-28 RX ADMIN — CLOPIDOGREL BISULFATE 75 MG: 75 TABLET, FILM COATED ORAL at 08:50

## 2021-05-28 RX ADMIN — FOLIC ACID 1 MG: 1 TABLET ORAL at 08:49

## 2021-05-28 RX ADMIN — INSULIN LISPRO 7 UNITS: 100 INJECTION, SOLUTION INTRAVENOUS; SUBCUTANEOUS at 13:04

## 2021-05-28 RX ADMIN — ATORVASTATIN CALCIUM 40 MG: 40 TABLET, FILM COATED ORAL at 08:50

## 2021-05-28 RX ADMIN — SERTRALINE 200 MG: 50 TABLET, FILM COATED ORAL at 08:50

## 2021-05-28 RX ADMIN — Medication 100 MG: at 08:50

## 2021-05-28 RX ADMIN — NALTREXONE HYDROCHLORIDE 50 MG: 50 TABLET, FILM COATED ORAL at 08:50

## 2021-05-28 RX ADMIN — THERA TABS 1 TABLET: TAB at 08:50

## 2021-05-28 NOTE — BH NOTES
1930  Assumed care of patient from day shift nurse. 2000  Patient observed sitting in the day room. 2030 Patient ate snack in the day room. Denies current SI/HI/AVH. Patient is pleasant. Patient is discharge focused. No signs and symptoms of withdrawal. No voiced concerns. 2123  Med complaint. 0000  Patient sleeping in room at this time. 0600  Patient slept for 9 hours this shift. No issues to note at this time. Will continue to monitor for safety, location and behavior [id number].

## 2021-05-28 NOTE — DISCHARGE SUMMARY
PSYCHIATRIC DISCHARGE SUMMARY         IDENTIFICATION:    Patient Name  Jennifer Guerrier   Date of Birth 1955   [facility] [id number]   Medical Record Number  [id number]      Age  76 y.o.    PCP Skiff, Marijane Armor, NP   Admit date:  5/23/2021    Discharge date: 5/28/2021   Room Number  327/02  @ [address]   Date of Service  5/28/2021            TYPE OF DISCHARGE: REGULAR               CONDITION AT DISCHARGE: improved and fair       PROVISIONAL & DISCHARGE DIAGNOSES:    Problem List  Date Reviewed: 8/8/2017        Codes Class    Alcohol use disorder, moderate, dependence ([facility] ICD-10-CM: F10.20  ICD-9-CM: 303.90         Adjustment disorder with depressed mood ICD-10-CM: F43.21  ICD-9-CM: 309.0         Needs smoking cessation education ICD-10-CM: F17.200  ICD-9-CM: V62.3         History of stroke ICD-10-CM: Z86.73  ICD-9-CM: V12.54         * (Principal) Schizoaffective disorder, depressive type ([facility] ICD-10-CM: F25.1  ICD-9-CM: 295.70         Insulin dependent type 2 diabetes mellitus, uncontrolled ([facility] ICD-10-CM: E11.65, Z79.4  ICD-9-CM: 250.02, V58.67         Essential hypertension ICD-10-CM: I10  ICD-9-CM: 401.9         Sleep apnea ICD-10-CM: G47.30  ICD-9-CM: 780.57               Active Hospital Problems    Alcohol use disorder, moderate, dependence ([facility]      Adjustment disorder with depressed mood      Essential hypertension      History of stroke      *Schizoaffective disorder, depressive type ([facility]      Insulin dependent type 2 diabetes mellitus, uncontrolled ([facility]        DISCHARGE DIAGNOSIS:   Axis I:  SEE ABOVE  Axis II: SEE ABOVE  Axis III: SEE ABOVE  Axis IV:  lack of structure  Axis V:  20 on admission, 55 on discharge     CC & HISTORY OF PRESENT ILLNESS:  \"suicidal ideation / EtOH withdrawal\"     The Saleem Anderson, is Margaret.Haitian y.o.  WHITE male with a past psychiatric history significant for MDD and EtOH use disorder, who presents at this time with complaints of (and/or evidence of) the following emotional symptoms: depression and suicidal thoughts/threats.  Additional symptomatology include escalation of EtOH abuse.  The above symptoms have been present for 2+ months. These symptoms are of moderate to high severity. These symptoms are constant in nature.  The patient's condition has been precipitated by psychosocial stressors.  Patient's condition made worse by alcohol use as well as treatment noncompliance. UDS: negative; BAL=+154 on admission.      The patient is a fair historian. The patient corroborates the above narrative. The patient contracts for safety on the unit and gives consent for the team to contact collateral. The patient is amenable to initiating treatment while on the unit. The patient acknowledges high SSRI but states he would prefer to stay on the same dose despite a long period of not taking it. He denies having schizophrenia but then acknowledges hearing voices. He is open to streamlining his regimen, specifically he is taking a second antipsychotic with known instance of metabolic changes that may worsen his obesity and IDDM. Per pharmacist, patient was supposed to be on Lantus but stopped along with other medications when his insurance changed. He reports a recent stroke vs TIA with some residual L sided weakness. The patient declines rehab placement but states he is open to taking Naltrexone.     5/26 - patient slept 8 hours overnight. He is calm and cooperative, allowed blood draw this morning, thus far TSH WNL. Patient has been visible, able to make his needs known. He denies SI/HI/AVH/PI. Discussed schizophrenia as patient has no charted history of psychotic illness. He states that he in fact was diagnosed with schizophrenia in the past and this is why he is taking Trilafon. Patient's wife confirmed the regimen. Patient has been otherwise in good behavioral control.     5/27 - patient doing well; he slept 6 hours overnight, denies SI/HI/AVH/PI.  Patient with HbA1C of 10.3, will be starting insulin long acting regimen this evening. Patient voiced understanding of this development as well as the need for him to remain in the hospital overnight. Patient briefed on naltrexone for EtOH use disorder, is amenable to starting this medication. SOCIAL HISTORY:    Social History     Socioeconomic History    Marital status:      Spouse name: Not on file    Number of children: Not on file    Years of education: Not on file    Highest education level: Not on file   Occupational History    Not on file   Tobacco Use    Smoking status: Current Every Day Smoker     Packs/day: 1.00    Smokeless tobacco: Never Used   Substance and Sexual Activity    Alcohol use: Yes     Comment: drinks a fifth of wehiskey and a case a beer per day    Drug use: No    Sexual activity: Not on file   Other Topics Concern    Not on file   Social History Narrative    Not on file     Social Determinants of Health     Financial Resource Strain:     Difficulty of Paying Living Expenses:    Food Insecurity:     Worried About Running Out of Food in the Last Year:     Dulce Maria of Food in the Last Year:    Transportation Needs:     Lack of Transportation (Medical):  Lack of Transportation (Non-Medical):    Physical Activity:     Days of Exercise per Week:     Minutes of Exercise per Session:    Stress:     Feeling of Stress :    Social Connections:     Frequency of Communication with Friends and Family:     Frequency of Social Gatherings with Friends and Family:     Attends Denominational Services:     Active Member of Clubs or Organizations:     Attends Club or Organization Meetings:     Marital Status:    Intimate Partner Violence:     Fear of Current or Ex-Partner:     Emotionally Abused:     Physically Abused:     Sexually Abused:       FAMILY HISTORY:   History reviewed. No pertinent family history.           HOSPITALIZATION COURSE:    Mary Meza was admitted to the inpatient psychiatric unit [facility] for acute psychiatric stabilization in regards to symptomatology as described in the HPI above. The differential diagnosis at time of admission included: EtOH induced depressive episode with onset during withdrawal, Wenicke's encephalopathy and schizoaffective disorder. While on the unit Trae Braden was involved in individual, group, occupational and milieu therapy. Psychiatric medications were adjusted during this hospitalization including Zyprexa which was discontinued due to metabolic interaction, and Trilafon which was tapered as patient did not appear to have acute symptoms. Naltrexone was started due to alcohol craving. Trae Braden demonstrated a slow, but progressive improvement in overall condition. Much of patient's initial presentation appeared to be related to situational stressors, effects of medication non-compliance, EtOH abuse, and psychological factors. Please see individual progress notes for more specific details regarding patient's hospitalization course. Patient noted to have Hb A1C > 10, started on Lantus 15U QHS. Patient will follow up with his PCP to continue treatment of hyperglycemia. At time of discharge, Trae Braden is without significant problems of depression, psychosis, or macho. Patient free of suicidal and homicidal ideations (appears to be at very low risk of of suicide or homicide) and reports many positive predictive factors in terms of not attempting suicide or homicide. Overall presentation at time of discharge is most consistent with the diagnosis of schizoaffective disorder and alcohol use disorder. Patient has maximized benefit to be derived from acute inpatient psychiatric treatment. All members of the treatment team concur with each other in regards to plans for discharge today. Patient and family are aware and in agreement with discharge and discharge plan.          LABS AND IMAGAING:    Labs Reviewed   CBC WITH AUTOMATED DIFF - Abnormal; Notable for the following components:       Result Value    BASOPHILS 2 (*)     All other components within normal limits   METABOLIC PANEL, COMPREHENSIVE - Abnormal; Notable for the following components:    Glucose 185 (*)     BUN/Creatinine ratio 9 (*)     All other components within normal limits   ETHYL ALCOHOL - Abnormal; Notable for the following components:    ALCOHOL(ETHYL),SERUM 154 (*)     All other components within normal limits   HEMOGLOBIN A1C WITH EAG - Abnormal; Notable for the following components:    Hemoglobin A1c 10.3 (*)     All other components within normal limits   GLUCOSE, POC - Abnormal; Notable for the following components:    Glucose (POC) 286 (*)     All other components within normal limits   GLUCOSE, POC - Abnormal; Notable for the following components:    Glucose (POC) 298 (*)     All other components within normal limits   GLUCOSE, POC - Abnormal; Notable for the following components:    Glucose (POC) 289 (*)     All other components within normal limits   GLUCOSE, POC - Abnormal; Notable for the following components:    Glucose (POC) 292 (*)     All other components within normal limits   GLUCOSE, POC - Abnormal; Notable for the following components:    Glucose (POC) 197 (*)     All other components within normal limits   GLUCOSE, POC - Abnormal; Notable for the following components:    Glucose (POC) 231 (*)     All other components within normal limits   GLUCOSE, POC - Abnormal; Notable for the following components:    Glucose (POC) 306 (*)     All other components within normal limits   GLUCOSE, POC - Abnormal; Notable for the following components:    Glucose (POC) 299 (*)     All other components within normal limits   GLUCOSE, POC - Abnormal; Notable for the following components:    Glucose (POC) 240 (*)     All other components within normal limits   GLUCOSE, POC - Abnormal; Notable for the following components:    Glucose (POC) 227 (*)     All other components within normal limits   DRUG SCREEN, URINE   COVID-19 WITH INFLUENZA A/B   TSH 3RD GENERATION   LIPID PANEL     No results found for: DS35, PHEN, PHENO, PHENT, DILF, DS39, PHENY, PTN, VALF2, VALAC, VALP, VALPR, DS6, CRBAM, CRBAMP, CARB2, XCRBAM  Admission on 05/23/2021   Component Date Value Ref Range Status    WBC 05/23/2021 4.7  4.1 - 11.1 K/uL Final    RBC 05/23/2021 4.41  4.10 - 5.70 M/uL Final    HGB 05/23/2021 14.1  12.1 - 17.0 g/dL Final    HCT 05/23/2021 40.0  36.6 - 50.3 % Final    MCV 05/23/2021 90.7  80.0 - 99.0 FL Final    MCH 05/23/2021 32.0  26.0 - 34.0 PG Final    MCHC 05/23/2021 35.3  30.0 - 36.5 g/dL Final    RDW 05/23/2021 12.6  11.5 - 14.5 % Final    PLATELET 26/26/9433 875  150 - 400 K/uL Final    MPV 05/23/2021 10.4  8.9 - 12.9 FL Final    NRBC 05/23/2021 0.0  0  WBC Final    ABSOLUTE NRBC 05/23/2021 0.00  0.00 - 0.01 K/uL Final    NEUTROPHILS 05/23/2021 63  32 - 75 % Final    LYMPHOCYTES 05/23/2021 25  12 - 49 % Final    MONOCYTES 05/23/2021 7  5 - 13 % Final    EOSINOPHILS 05/23/2021 3  0 - 7 % Final    BASOPHILS 05/23/2021 2* 0 - 1 % Final    IMMATURE GRANULOCYTES 05/23/2021 0  0.0 - 0.5 % Final    ABS. NEUTROPHILS 05/23/2021 3.0  1.8 - 8.0 K/UL Final    ABS. LYMPHOCYTES 05/23/2021 1.2  0.8 - 3.5 K/UL Final    ABS. MONOCYTES 05/23/2021 0.3  0.0 - 1.0 K/UL Final    ABS. EOSINOPHILS 05/23/2021 0.1  0.0 - 0.4 K/UL Final    ABS. BASOPHILS 05/23/2021 0.1  0.0 - 0.1 K/UL Final    ABS. IMM.  GRANS. 05/23/2021 0.0  0.00 - 0.04 K/UL Final    DF 05/23/2021 AUTOMATED    Final    Sodium 05/23/2021 136  136 - 145 mmol/L Final    Potassium 05/23/2021 3.9  3.5 - 5.1 mmol/L Final    Chloride 05/23/2021 98  97 - 108 mmol/L Final    CO2 05/23/2021 26  21 - 32 mmol/L Final    Anion gap 05/23/2021 12  5 - 15 mmol/L Final    Glucose 05/23/2021 185* 65 - 100 mg/dL Final    BUN 05/23/2021 8  6 - 20 MG/DL Final    Creatinine 05/23/2021 0.93  0.70 - 1.30 MG/DL Final  BUN/Creatinine ratio 05/23/2021 9* 12 - 20   Final    GFR est AA 05/23/2021 >60  >60 ml/min/1.73m2 Final    GFR est non-AA 05/23/2021 >60  >60 ml/min/1.73m2 Final    Calcium 05/23/2021 9.1  8.5 - 10.1 MG/DL Final    Bilirubin, total 05/23/2021 0.3  0.2 - 1.0 MG/DL Final    ALT (SGPT) 05/23/2021 68  12 - 78 U/L Final    AST (SGOT) 05/23/2021 36  15 - 37 U/L Final    Alk.  phosphatase 05/23/2021 59  45 - 117 U/L Final    Protein, total 05/23/2021 7.5  6.4 - 8.2 g/dL Final    Albumin 05/23/2021 4.1  3.5 - 5.0 g/dL Final    Globulin 05/23/2021 3.4  2.0 - 4.0 g/dL Final    A-G Ratio 05/23/2021 1.2  1.1 - 2.2   Final    ALCOHOL(ETHYL),SERUM 05/23/2021 154* <10 MG/DL Final    AMPHETAMINES 05/23/2021 Negative  NEG   Final    BARBITURATES 05/23/2021 Negative  NEG   Final    BENZODIAZEPINES 05/23/2021 Negative  NEG   Final    COCAINE 05/23/2021 Negative  NEG   Final    METHADONE 05/23/2021 Negative  NEG   Final    OPIATES 05/23/2021 Negative  NEG   Final    PCP(PHENCYCLIDINE) 05/23/2021 Negative  NEG   Final    THC (TH-CANNABINOL) 05/23/2021 Negative  NEG   Final    Drug screen comment 05/23/2021 (NOTE)   Final    SARS-CoV-2 05/23/2021 Not detected  NOTD   Final    Influenza A by PCR 05/23/2021 Not detected  NOTD   Final    Influenza B by PCR 05/23/2021 Not detected  NOTD   Final    Glucose (POC) 05/25/2021 286* 65 - 117 mg/dL Final    Performed by 05/25/2021 Mealy Afshan   Final    TSH 05/26/2021 1.30  0.36 - 3.74 uIU/mL Final    Cholesterol, total 05/26/2021 173  <200 MG/DL Final    Triglyceride 05/26/2021 106  <150 MG/DL Final    HDL Cholesterol 05/26/2021 68  MG/DL Final    LDL, calculated 05/26/2021 83.8  0 - 100 MG/DL Final    VLDL, calculated 05/26/2021 21.2  MG/DL Final    CHOL/HDL Ratio 05/26/2021 2.5  0.0 - 5.0   Final    Hemoglobin A1c 05/26/2021 10.3* 4.0 - 5.6 % Final    Est. average glucose 05/26/2021 249  mg/dL Final    Glucose (POC) 05/26/2021 298* 65 - 117 mg/dL Final  Performed by 05/26/2021 Jh    Final    Glucose (POC) 05/26/2021 289* 65 - 117 mg/dL Final    Performed by 05/26/2021 Mealy Afshan   Final    Glucose (POC) 05/26/2021 292* 65 - 117 mg/dL Final    Performed by 05/26/2021 Mealy Afshan   Final    Glucose (POC) 05/26/2021 197* 65 - 117 mg/dL Final    Performed by 05/26/2021 Everette Jordan   Final    Glucose (POC) 05/27/2021 231* 65 - 117 mg/dL Final    Performed by 05/27/2021 Macho Tim RN   Final    Glucose (POC) 05/27/2021 306* 65 - 117 mg/dL Final    Performed by 05/27/2021 Macho Tim RN   Final    Glucose (POC) 05/27/2021 299* 65 - 117 mg/dL Final    Performed by 05/27/2021 Macho Tim RN   Final    Glucose (POC) 05/27/2021 240* 65 - 117 mg/dL Final    Performed by 05/27/2021 Williams Lanza RN   Final    Glucose (POC) 05/28/2021 227* 65 - 117 mg/dL Final    Performed by 05/28/2021 Aloma Kidney   Final     No results found. DISPOSITION:    Home. Patient to f/u with etoh rehabilitation, psychiatric, and psychotherapy appointments. Patient is to f/u with internist as directed. FOLLOW-UP CARE:    Activity as tolerated  Regular diet  Wound Care: none needed. Follow-up Information     Follow up With Specialties Details Why [address]  On 6/29/2021 Medication management virtual appointment on Tuesday, June 29th at 10:45AM. They will call you the day before to confirm appointment. [address] [facility],[address] [place] [address] [address] Final  Call Sumanth Arrieta  [phone]  Fax: [phone].  [address] Geovany Rabago follow up appointment today, Friday May 28th at 4:00PM.  Address: [address] [place] [address], [address]  Phone: [phone]    Skiff, Patt Juba, NP Nurse Practitioner   [address]  [facility] [address] [phone]                   PROGNOSIS:   Rimma Jaramillo ---- based on nature of patient's pathology/ies and treatment compliance issues. Prognosis is greatly dependent upon patient's ability to remain sober and to follow up with scheduled appointments as well as to comply with psychiatric medications as prescribed. DISCHARGE MEDICATIONS:     Informed consent given for the use of following psychotropic medications:  Current Discharge Medication List      START taking these medications    Details   insulin glargine (LANTUS) 100 unit/mL injection 15 Units by SubCUTAneous route nightly. Indications: type 2 diabetes mellitus  Qty: 1 Vial, Refills: 0  Start date: 5/28/2021      naltrexone (DEPADE) 50 mg tablet Take 1 Tablet by mouth daily. Indications: alcoholism  Qty: 30 Tablet, Refills: 1  Start date: 5/29/2021         CONTINUE these medications which have CHANGED    Details   atorvastatin (LIPITOR) 40 mg tablet Take 1 Tablet by mouth daily. Indications: high cholesterol and high triglycerides  Qty: 30 Tablet, Refills: 1  Start date: 5/28/2021      clopidogreL (PLAVIX) 75 mg tab Take 1 Tablet by mouth daily. Indications: prevention for a blood clot going to the brain  Qty: 30 Tablet, Refills: 1  Start date: 5/28/2021      lisinopriL (PRINIVIL, ZESTRIL) 40 mg tablet Take 1 Tablet by mouth daily. Indications: high blood pressure  Qty: 30 Tablet, Refills: 1  Start date: 5/28/2021      sertraline (ZOLOFT) 100 mg tablet Take 2 Tablets by mouth daily. Indications: major depressive disorder  Qty: 30 Tablet, Refills: 1  Start date: 5/28/2021      traZODone (DESYREL) 100 mg tablet Take 1 Tablet by mouth nightly. Indications: insomnia  Qty: 30 Tablet, Refills: 1  Start date: 5/28/2021      aspirin delayed-release 81 mg tablet Take 1 Tablet by mouth daily. Indications: prevention of transient ischemic attack  Qty: 30 Tablet, Refills: 1  Start date: 5/28/2021      cholecalciferol (VITAMIN D3) (5000 Units/125 mcg) tab tablet Take 1 Tablet by mouth daily.  Indications: prevention of vitamin D deficiency  Qty: 30 Tablet, Refills: 1  Start date: 5/28/2021      metFORMIN (GLUCOPHAGE) 1,000 mg tablet Take 1 Tablet by mouth two (2) times daily (with meals). Indications: type 2 diabetes mellitus  Qty: 60 Tablet, Refills: 1  Start date: 5/28/2021      pantoprazole (PROTONIX) 40 mg tablet Take 1 Tablet by mouth daily. Indications: gastroesophageal reflux disease  Qty: 30 Tablet, Refills: 1  Start date: 5/28/2021      perphenazine (TRILAFON) 4 mg tablet Take 1 Tablet by mouth daily. Indications: bipolar 2 disorder  Qty: 30 Tablet, Refills: 1  Start date: 5/29/2021         STOP taking these medications       OLANZapine (ZyPREXA) 2.5 mg tablet Comments:   Reason for Stopping:                      A coordinated, multidisplinary treatment team round was conducted with Alonzo Hernandez---this is done daily here at [facility]. This team consists of the nurse, psychiatric unit pharmacist,  and Antonia Force. I have spent greater than 35 minutes on discharge work.     Signed:  Sasha Villagran MD  5/28/2021

## 2021-05-28 NOTE — PROGRESS NOTES
Problem: Falls - Risk of  Goal: *Absence of Falls  Description: Document Alfred Walton Fall Risk and appropriate interventions in the flowsheet.   Outcome: Progressing Towards Goal  Note: Fall Risk Interventions:            Medication Interventions: Teach patient to arise slowly

## 2021-05-28 NOTE — BH NOTES
0700- [facility] report from [address] is alert/oriented x4. Calm and Cooperative. Mood is good. Meds/meal compliant. No behavioral issues. Denies suicidal and homicidal ideations. Denies auditory and visual hallucinations. Will continue to monitor pt with q15 min rounds for safety.

## 2021-05-28 NOTE — DISCHARGE INSTRUCTIONS
Patient Education        Adjustment Disorder: Care Instructions  Your Care Instructions     Adjustment disorder means that you have emotional or behavioral problems because of stress. But your response to the stress is far more severe than a normal response. It is severe enough to affect your work or social life and may cause depression and physical pains and problems. Events that may cause this response can include a divorce, money problems, or starting school or a new job. It might be anything that causes some stress. This disorder is most often a short-term problem. It happens within 3 months of the stressful event or change. If the response lasts longer than 6 months after the event ends, you may have a more serious disorder. Follow-up care is a key part of your treatment and safety. Be sure to make and go to all appointments, and call your doctor if you are having problems. It's also a good idea to know your test results and keep a list of the medicines you take. How can you care for yourself at home? · Go to all counseling sessions. Do not skip any because you are feeling better. · If your doctor prescribed medicines, take them exactly as prescribed. Call your doctor if you think you are having a problem with your medicine. You will get more details on the specific medicines your doctor prescribes. · Discuss the causes of your stress with a good friend or family member. Or you can join a support group for people with similar problems. Talking to others sometimes relieves stress. · Get at least 30 minutes of exercise on most days of the week. Walking is a good choice. You also may want to do other activities, such as running, swimming, cycling, or playing tennis or team sports. Relaxation techniques  Do relaxation exercises 10 to 20 minutes a day. You can play soothing, relaxing music while you do them, if you wish. · Tell others in your house that you are going to do your relaxation exercises.  Ask them not to disturb you. · Find a comfortable, quiet place. · Lie down on your back, or sit with your back straight. · Focus on your breathing. Make it slow and steady. · Breathe in through your nose. Breathe out through either your nose or mouth. · Breathe deeply, filling up the area between your navel and your rib cage. Breathe so that your belly goes up and down. · Do not hold your breath. · Breathe like this for 5 to 10 minutes. Notice the feeling of calmness throughout your whole body. As you continue to breathe slowly and deeply, relax by doing these next steps for another 5 to 10 minutes:  · Tighten and relax each muscle group in your body. Start at your toes, and work your way up to your head. · Imagine your muscle groups relaxing and getting heavy. · Empty your mind of all thoughts. · Let yourself relax more and more deeply. · Be aware of the state of calmness that surrounds you. · When your relaxation time is over, you can bring yourself back to alertness by moving your fingers and toes. Then move your hands and feet. And then move your entire body. Sometimes people fall asleep during relaxation. But they most often wake up soon. · Always give yourself time to return to full alertness before you drive a car. Wait to do anything that might cause an accident if you are not fully alert. Never play a relaxation tape while you drive a car. When should you call for help? Call 911 anytime you think you may need emergency care. For example, call if:    · You feel you cannot stop from hurting yourself or someone else. Keep the numbers for these national suicide hotlines: 1-499-202-TALK (6-120.841.2082) and 0-274-SANSNHZ (2-668.985.5020). If you or someone you know talks about suicide or feeling hopeless, get help right away.    Watch closely for changes in your health, and be sure to contact your doctor if:    · You have new anxiety, or your anxiety gets worse.     · You have been feeling sad, depressed, or hopeless or have lost interest in things that you usually enjoy.     · You do not get better as expected. Where can you learn more? Go to http://www.gray.com/  Enter R087 in the search box to learn more about \"Adjustment Disorder: Care Instructions. \"  Current as of: 2020               Content Version: 12.8  © 8770-4849 [employer]. Care instructions adapted under license by [employer] (which disclaims liability or warranty for this information). If you have questions about a medical condition or this instruction, always ask your healthcare professional. [place] [address] any warranty or liability for your use of this information. DISCHARGE SUMMARY    NAME:Amandeep Nation  : 1955  MRN: [id number]    The patient [facility] exhibits the ability to control behavior in a less restrictive environment. Patient's level of functioning is improving. No assaultive/destructive behavior has been observed for the past 24 hours. No suicidal/homicidal threat or behavior has been observed for the past 24 hours. There is no evidence of serious medication side effects. Patient has not been in physical or protective restraints for at least the past 24 hours. If weapons involved, how are they secured? None involved. Is patient aware of and in agreement with discharge plan? Yes    Arrangements for medication:  Prescriptions sent to local pharmacy. Copy of discharge instructions to provider?:  Katie Parmar    Arrangements for transportation home:  Cab    Keep all follow up appointments as scheduled, continue to take prescribed medications per physician instructions.   Mental health crisis number:  239 or your local mental health crisis line number at [address] [facility] [address] [facility] WARM LINE      7-118-083-MHAV ([phone])      M-F: 9am to 9pm      Sat & Sun: 5pm - 9pm  National suicide prevention lines: 7-242-AEDYPAL (2-730-565-995-870-3400)       0-370-252-TALK (7-184-840-794.895.1263)   24/7 Crisis Text Line:  Text HOME to 516718    If I feel I am at risk of hurting myself or others, I will call the crisis office and speak with a crisis worker who will assist me during my crisis. [address] [address]  [phone]  [address] [facility] [address] [phone]  [address] [facility]  [place] [address] Crisis-  [phone]

## 2021-05-28 NOTE — BH NOTES
Behavioral Health Transition Record to Provider    Patient Name: Shahana Arellano  YOB: 1955  Medical Record Number: [id number]  Date of Admission: 5/23/2021  Date of Discharge: 5/28/2021    Attending Provider: Nikolai Isidro, *  Discharging Provider: Sasha Villagran MD  To contact this individual call [phone] and ask the  to page. If unavailable, ask to be transferred to [facility] Provider on call. [facility] Provider will be available on call 24/7 and during holidays. Primary Care Provider: Maricarmen Canseco NP    Allergies   Allergen Reactions    Egg Swelling       Reason for Admission: SI    Ad[facility]: [facility] ([facility] [F39]    * No surgery found *    Results for orders placed or performed during the hospital encounter of 05/23/21   CBC WITH AUTOMATED DIFF   Result Value Ref Range    WBC 4.7 4.1 - 11.1 K/uL    RBC 4.41 4.10 - 5.70 M/uL    HGB 14.1 12.1 - 17.0 g/dL    HCT 40.0 36.6 - 50.3 %    MCV 90.7 80.0 - 99.0 FL    MCH 32.0 26.0 - 34.0 PG    MCHC 35.3 30.0 - 36.5 g/dL    RDW 12.6 11.5 - 14.5 %    PLATELET 898 765 - 247 K/uL    MPV 10.4 8.9 - 12.9 FL    NRBC 0.0 0  WBC    ABSOLUTE NRBC 0.00 0.00 - 0.01 K/uL    NEUTROPHILS 63 32 - 75 %    LYMPHOCYTES 25 12 - 49 %    MONOCYTES 7 5 - 13 %    EOSINOPHILS 3 0 - 7 %    BASOPHILS 2 (H) 0 - 1 %    IMMATURE GRANULOCYTES 0 0.0 - 0.5 %    ABS. NEUTROPHILS 3.0 1.8 - 8.0 K/UL    ABS. LYMPHOCYTES 1.2 0.8 - 3.5 K/UL    ABS. MONOCYTES 0.3 0.0 - 1.0 K/UL    ABS. EOSINOPHILS 0.1 0.0 - 0.4 K/UL    ABS. BASOPHILS 0.1 0.0 - 0.1 K/UL    ABS. IMM.  GRANS. 0.0 0.00 - 0.04 K/UL    DF AUTOMATED     METABOLIC PANEL, COMPREHENSIVE   Result Value Ref Range    Sodium 136 136 - 145 mmol/L    Potassium 3.9 3.5 - 5.1 mmol/L    Chloride 98 97 - 108 mmol/L    CO2 26 21 - 32 mmol/L    Anion gap 12 5 - 15 mmol/L    Glucose 185 (H) 65 - 100 mg/dL    BUN 8 6 - 20 MG/DL    Creatinine 0.93 0.70 - 1.30 MG/DL BUN/Creatinine ratio 9 (L) 12 - 20      GFR est AA >60 >60 ml/min/1.73m2    GFR est non-AA >60 >60 ml/min/1.73m2    Calcium 9.1 8.5 - 10.1 MG/DL    Bilirubin, total 0.3 0.2 - 1.0 MG/DL    ALT (SGPT) 68 12 - 78 U/L    AST (SGOT) 36 15 - 37 U/L    Alk.  phosphatase 59 45 - 117 U/L    Protein, total 7.5 6.4 - 8.2 g/dL    Albumin 4.1 3.5 - 5.0 g/dL    Globulin 3.4 2.0 - 4.0 g/dL    A-G Ratio 1.2 1.1 - 2.2     ETHYL ALCOHOL   Result Value Ref Range    ALCOHOL(ETHYL),SERUM 154 (H) <10 MG/DL   DRUG SCREEN, URINE   Result Value Ref Range    AMPHETAMINES Negative NEG      BARBITURATES Negative NEG      BENZODIAZEPINES Negative NEG      COCAINE Negative NEG      METHADONE Negative NEG      OPIATES Negative NEG      PCP(PHENCYCLIDINE) Negative NEG      THC (TH-CANNABINOL) Negative NEG      Drug screen comment (NOTE)    COVID-19 WITH INFLUENZA A/B   Result Value Ref Range    SARS-CoV-2 Not detected NOTD      Influenza A by PCR Not detected NOTD      Influenza B by PCR Not detected NOTD     TSH 3RD GENERATION   Result Value Ref Range    TSH 1.30 0.36 - 3.74 uIU/mL   LIPID PANEL   Result Value Ref Range    Cholesterol, total 173 <200 MG/DL    Triglyceride 106 <150 MG/DL    HDL Cholesterol 68 MG/DL    LDL, calculated 83.8 0 - 100 MG/DL    VLDL, calculated 21.2 MG/DL    CHOL/HDL Ratio 2.5 0.0 - 5.0     HEMOGLOBIN A1C WITH EAG   Result Value Ref Range    Hemoglobin A1c 10.3 (H) 4.0 - 5.6 %    Est. average glucose 249 mg/dL   GLUCOSE, POC   Result Value Ref Range    Glucose (POC) 286 (H) 65 - 117 mg/dL    Performed by Gasper Alexander    GLUCOSE, POC   Result Value Ref Range    Glucose (POC) 298 (H) 65 - 117 mg/dL    Performed by Gasper Alexander    GLUCOSE, POC   Result Value Ref Range    Glucose (POC) 289 (H) 65 - 117 mg/dL    Performed by Gasper Alexander    GLUCOSE, POC   Result Value Ref Range    Glucose (POC) 292 (H) 65 - 117 mg/dL    Performed by Gasper Alexander    GLUCOSE, POC   Result Value Ref Range    Glucose (POC) 197 (H) 65 - 117 mg/dL    Performed by Barbara Alonzo    GLUCOSE, POC   Result Value Ref Range    Glucose (POC) 231 (H) 65 - 117 mg/dL    Performed by Pat Tompkins RN    GLUCOSE, POC   Result Value Ref Range    Glucose (POC) 306 (H) 65 - 117 mg/dL    Performed by Pat Tompkins RN    GLUCOSE, POC   Result Value Ref Range    Glucose (POC) 299 (H) 65 - 117 mg/dL    Performed by Pat Tompkins RN    GLUCOSE, POC   Result Value Ref Range    Glucose (POC) 240 (H) 65 - 117 mg/dL    Performed by Andre Dyson RN    GLUCOSE, POC   Result Value Ref Range    Glucose (POC) 227 (H) 65 - 117 mg/dL    Performed by Chivo Ackerman        Immunizations administered during this encounter: There is no immunization history on file for this patient. Screening for Metabolic Disorders for Patients on Antipsychotic Medications  (Data obtained from the EMR)    Estimated Body Mass Index  Estimated body mass index is 33.09 kg/m² as calculated from the following:    Height as of this encounter: 6' (1.829 m). Weight as of this encounter: 110.7 kg (244 lb). Vital Signs/Blood Pressure  Visit Vitals  /89   Pulse 64   Temp 98.2 °F (36.8 °C)   Resp 18   Ht 6' (1.829 m)   Wt 110.7 kg (244 lb)   SpO2 96%   BMI 33.09 kg/m²       Blood Glucose/Hemoglobin A1c  Lab Results   Component Value Date/Time    Glucose 185 (H) 05/23/2021 06:15 PM    Glucose 211 (H) 09/06/2012 04:17 AM    Glucose (POC) 227 (H) 05/28/2021 08:00 AM       Lab Results   Component Value Date/Time    Hemoglobin A1c 10.3 (H) 05/26/2021 05:25 AM        Lipid Panel  Lab Results   Component Value Date/Time    Cholesterol, total 173 05/26/2021 05:25 AM    HDL Cholesterol 68 05/26/2021 05:25 AM    LDL, calculated 83.8 05/26/2021 05:25 AM    Triglyceride 106 05/26/2021 05:25 AM    CHOL/HDL Ratio 2.5 05/26/2021 05:25 AM        Discharge Diagnosis: Please refer to physician's discharge summary.      Discharge Plan:   The patient Salty Walters exhibits the ability to control behavior in a less restrictive environment. Patient's level of functioning is improving. No assaultive/destructive behavior has been observed for the past 24 hours. No suicidal/homicidal threat or behavior has been observed for the past 24 hours. There is no evidence of serious medication side effects. Patient has not been in physical or protective restraints for at least the past 24 hours. Discharge Medication List and Instructions:   Current Discharge Medication List      START taking these medications    Details   insulin glargine (LANTUS) 100 unit/mL injection 15 Units by SubCUTAneous route nightly. Indications: type 2 diabetes mellitus  Qty: 1 Vial, Refills: 0  Start date: 5/28/2021      naltrexone (DEPADE) 50 mg tablet Take 1 Tablet by mouth daily. Indications: alcoholism  Qty: 30 Tablet, Refills: 1  Start date: 5/29/2021         CONTINUE these medications which have CHANGED    Details   atorvastatin (LIPITOR) 40 mg tablet Take 1 Tablet by mouth daily. Indications: high cholesterol and high triglycerides  Qty: 30 Tablet, Refills: 1  Start date: 5/28/2021      clopidogreL (PLAVIX) 75 mg tab Take 1 Tablet by mouth daily. Indications: prevention for a blood clot going to the brain  Qty: 30 Tablet, Refills: 1  Start date: 5/28/2021      lisinopriL (PRINIVIL, ZESTRIL) 40 mg tablet Take 1 Tablet by mouth daily. Indications: high blood pressure  Qty: 30 Tablet, Refills: 1  Start date: 5/28/2021      sertraline (ZOLOFT) 100 mg tablet Take 2 Tablets by mouth daily. Indications: major depressive disorder  Qty: 30 Tablet, Refills: 1  Start date: 5/28/2021      traZODone (DESYREL) 100 mg tablet Take 1 Tablet by mouth nightly. Indications: insomnia  Qty: 30 Tablet, Refills: 1  Start date: 5/28/2021      aspirin delayed-release 81 mg tablet Take 1 Tablet by mouth daily.  Indications: prevention of transient ischemic attack  Qty: 30 Tablet, Refills: 1  Start date: 5/28/2021      cholecalciferol (VITAMIN D3) (5000 Units/125 mcg) tab tablet Take 1 Tablet by mouth daily. Indications: prevention of vitamin D deficiency  Qty: 30 Tablet, Refills: 1  Start date: 5/28/2021      metFORMIN (GLUCOPHAGE) 1,000 mg tablet Take 1 Tablet by mouth two (2) times daily (with meals). Indications: type 2 diabetes mellitus  Qty: 60 Tablet, Refills: 1  Start date: 5/28/2021      pantoprazole (PROTONIX) 40 mg tablet Take 1 Tablet by mouth daily. Indications: gastroesophageal reflux disease  Qty: 30 Tablet, Refills: 1  Start date: 5/28/2021      perphenazine (TRILAFON) 4 mg tablet Take 1 Tablet by mouth daily. Indications: bipolar 2 disorder  Qty: 30 Tablet, Refills: 1  Start date: 5/29/2021         STOP taking these medications       OLANZapine (ZyPREXA) 2.5 mg tablet Comments:   Reason for Stopping:               Unresulted Labs (24h ago, onward) Comment    None        To obtain results of studies pending at discharge, please contact N/A. Follow-up Information     Follow up With Specialties Details Why [address]  On 6/29/2021 Medication management virtual appointment on Tuesday, June 29th at 10:45AM. They will call you the day before to confirm appointment. [address] [place] [address],[address] [place] [address] [address]  Call Sumanth Arrieta  [phone]  Fax: [phone] [facility]. [address] follow up appointment today, Friday May 28th at 4:00PM.  Address: [address] [place], [place] [address] [facility] [address]  Phone: [phone]    Skiff, Marina Ramp, NP Nurse Practitioner   [address] [facility] [address] [phone]            Advanced Directive:   Does the patient have an appointed surrogate decision maker? Unknown   Does the patient have a Medical Advance Directive? Unknown   Does the patient have a Psychiatric Advance Directive?  Unknown   If the patient does not have a surrogate or Medical Advance Directive AND Psychiatric Advance Directive, the patient was offered information on these advance directives. Unknown     Patient Instructions: Please continue all medications until otherwise directed by physician. Tobacco Cessation Discharge Plan:   Is the patient a smoker and needs referral for smoking cessation? No  Patient referred to the following for smoking cessation with an appointment? No   Patient was offered medication to assist with smoking cessation at discharge? No  Was education for smoking cessation added to the discharge instructions? No     Alcohol/Substance Abuse Discharge Plan:   Does the patient have a history of substance/alcohol abuse and requires a referral for treatment? Yes  Patient referred to the following for substance/alcohol abuse treatment with an appointment? Yes  Patient was offered medication to assist with alcohol cessation at discharge? No  Was education for substance/alcohol abuse added to discharge instructions? Yes     Patient discharged to Home; provided to the patient/caregiver either in hard copy or electronically. Continuing care paperwork was faxed to community mental health providers.

## 2021-05-28 NOTE — BH NOTES
Behavioral Health Transition Record to Provider    Patient Name: Jey Lee  YOB: 1955  Medical Record Number: [id number]  Date of Admission: 5/23/2021  Date of Discharge: 5/28/2021    Attending Provider: Jing Duarte, *  Discharging Provider: Mina Gonzales MD  To contact this individual call [phone] and ask the  to page. If unavailable, ask to be transferred to [address] [place] [address] Provider on call. [facility] Provider will be available on call 24/7 and during holidays. Primary Care Provider: Kwaku Houston NP    Allergies   Allergen Reactions    Egg Swelling       Reason for Admission: SI    Admission Diagnosis: Mood disorder ([facility] [F39]    * No surgery found *    Results for orders placed or performed during the hospital encounter of 05/23/21   CBC WITH AUTOMATED DIFF   Result Value Ref Range    WBC 4.7 4.1 - 11.1 K/uL    RBC 4.41 4.10 - 5.70 M/uL    HGB 14.1 12.1 - 17.0 g/dL    HCT 40.0 36.6 - 50.3 %    MCV 90.7 80.0 - 99.0 FL    MCH 32.0 26.0 - 34.0 PG    MCHC 35.3 30.0 - 36.5 g/dL    RDW 12.6 11.5 - 14.5 %    PLATELET 318 679 - 250 K/uL    MPV 10.4 8.9 - 12.9 FL    NRBC 0.0 0  WBC    ABSOLUTE NRBC 0.00 0.00 - 0.01 K/uL    NEUTROPHILS 63 32 - 75 %    LYMPHOCYTES 25 12 - 49 %    MONOCYTES 7 5 - 13 %    EOSINOPHILS 3 0 - 7 %    BASOPHILS 2 (H) 0 - 1 %    IMMATURE GRANULOCYTES 0 0.0 - 0.5 %    ABS. NEUTROPHILS 3.0 1.8 - 8.0 K/UL    ABS. LYMPHOCYTES 1.2 0.8 - 3.5 K/UL    ABS. MONOCYTES 0.3 0.0 - 1.0 K/UL    ABS. EOSINOPHILS 0.1 0.0 - 0.4 K/UL    ABS. BASOPHILS 0.1 0.0 - 0.1 K/UL    ABS. IMM.  GRANS. 0.0 0.00 - 0.04 K/UL    DF AUTOMATED     METABOLIC PANEL, COMPREHENSIVE   Result Value Ref Range    Sodium 136 136 - 145 mmol/L    Potassium 3.9 3.5 - 5.1 mmol/L    Chloride 98 97 - 108 mmol/L    CO2 26 21 - 32 mmol/L    Anion gap 12 5 - 15 mmol/L    Glucose 185 (H) 65 - 100 mg/dL    BUN 8 6 - 20 MG/DL    Creatinine 0.93 0.70 - 1.30 MG/DL BUN/Creatinine ratio 9 (L) 12 - 20      GFR est AA >60 >60 ml/min/1.73m2    GFR est non-AA >60 >60 ml/min/1.73m2    Calcium 9.1 8.5 - 10.1 MG/DL    Bilirubin, total 0.3 0.2 - 1.0 MG/DL    ALT (SGPT) 68 12 - 78 U/L    AST (SGOT) 36 15 - 37 U/L    Alk.  phosphatase 59 45 - 117 U/L    Protein, total 7.5 6.4 - 8.2 g/dL    Albumin 4.1 3.5 - 5.0 g/dL    Globulin 3.4 2.0 - 4.0 g/dL    A-G Ratio 1.2 1.1 - 2.2     ETHYL ALCOHOL   Result Value Ref Range    ALCOHOL(ETHYL),SERUM 154 (H) <10 MG/DL   DRUG SCREEN, URINE   Result Value Ref Range    AMPHETAMINES Negative NEG      BARBITURATES Negative NEG      BENZODIAZEPINES Negative NEG      COCAINE Negative NEG      METHADONE Negative NEG      OPIATES Negative NEG      PCP(PHENCYCLIDINE) Negative NEG      THC (TH-CANNABINOL) Negative NEG      Drug screen comment (NOTE)    COVID-19 WITH INFLUENZA A/B   Result Value Ref Range    SARS-CoV-2 Not detected NOTD      Influenza A by PCR Not detected NOTD      Influenza B by PCR Not detected NOTD     TSH 3RD GENERATION   Result Value Ref Range    TSH 1.30 0.36 - 3.74 uIU/mL   LIPID PANEL   Result Value Ref Range    Cholesterol, total 173 <200 MG/DL    Triglyceride 106 <150 MG/DL    HDL Cholesterol 68 MG/DL    LDL, calculated 83.8 0 - 100 MG/DL    VLDL, calculated 21.2 MG/DL    CHOL/HDL Ratio 2.5 0.0 - 5.0     HEMOGLOBIN A1C WITH EAG   Result Value Ref Range    Hemoglobin A1c 10.3 (H) 4.0 - 5.6 %    Est. average glucose 249 mg/dL   GLUCOSE, POC   Result Value Ref Range    Glucose (POC) 286 (H) 65 - 117 mg/dL    Performed by Tashi Hutchinson    GLUCOSE, POC   Result Value Ref Range    Glucose (POC) 298 (H) 65 - 117 mg/dL    Performed by Tashi Hutchinson    GLUCOSE, POC   Result Value Ref Range    Glucose (POC) 289 (H) 65 - 117 mg/dL    Performed by Tashi Hutchinson    GLUCOSE, POC   Result Value Ref Range    Glucose (POC) 292 (H) 65 - 117 mg/dL    Performed by Tashi Hutchinson    GLUCOSE, POC   Result Value Ref Range    Glucose (POC) 197 (H) 65 - 117 mg/dL    Performed by Summer Lezama    GLUCOSE, POC   Result Value Ref Range    Glucose (POC) 231 (H) 65 - 117 mg/dL    Performed by Dallas Prather RN    GLUCOSE, POC   Result Value Ref Range    Glucose (POC) 306 (H) 65 - 117 mg/dL    Performed by Dallas Prather RN    GLUCOSE, POC   Result Value Ref Range    Glucose (POC) 299 (H) 65 - 117 mg/dL    Performed by Dallas Prather RN    GLUCOSE, POC   Result Value Ref Range    Glucose (POC) 240 (H) 65 - 117 mg/dL    Performed by Jennifer Rees RN    GLUCOSE, POC   Result Value Ref Range    Glucose (POC) 227 (H) 65 - 117 mg/dL    Performed by Lesli Duke        Immunizations administered during this encounter: There is no immunization history on file for this patient. Screening for Metabolic Disorders for Patients on Antipsychotic Medications  (Data obtained from the EMR)    Estimated Body Mass Index  Estimated body mass index is 33.09 kg/m² as calculated from the following:    Height as of this encounter: 6' (1.829 m). Weight as of this encounter: 110.7 kg (244 lb). Vital Signs/Blood Pressure  Visit Vitals  /89   Pulse 64   Temp 98.2 °F (36.8 °C)   Resp 18   Ht 6' (1.829 m)   Wt 110.7 kg (244 lb)   SpO2 96%   BMI 33.09 kg/m²       Blood Glucose/Hemoglobin A1c  Lab Results   Component Value Date/Time    Glucose 185 (H) 05/23/2021 06:15 PM    Glucose 211 (H) 09/06/2012 04:17 AM    Glucose (POC) 227 (H) 05/28/2021 08:00 AM       Lab Results   Component Value Date/Time    Hemoglobin A1c 10.3 (H) 05/26/2021 05:25 AM        Lipid Panel  Lab Results   Component Value Date/Time    Cholesterol, total 173 05/26/2021 05:25 AM    HDL Cholesterol 68 05/26/2021 05:25 AM    LDL, calculated 83.8 05/26/2021 05:25 AM    Triglyceride 106 05/26/2021 05:25 AM    CHOL/HDL Ratio 2.5 05/26/2021 05:25 AM        Discharge Diagnosis: Please refer to physician's discharge summary.      Discharge Plan: The patient Raul Walters exhibits the ability to control behavior in a less restrictive environment. Patient's level of functioning is improving. No assaultive/destructive behavior has been observed for the past 24 hours. No suicidal/homicidal threat or behavior has been observed for the past 24 hours. There is no evidence of serious medication side effects. Patient has not been in physical or protective restraints for at least the past 24 hours. If weapons involved, how are they secured? None involved. Is patient aware of and in agreement with discharge plan? Yes    Arrangements for medication:  Prescriptions sent to local pharmacy. Copy of discharge instructions to provider?:  Katie Parmar    Arrangements for transportation home:  Cab    Keep all follow up appointments as scheduled, continue to take prescribed medications per physician instructions. Mental health crisis number:  864 or your local mental health crisis line number at [address] [facility] [address] Emergency WARM LINE      8-934-326-MHAV ([phone])      M-F: 9am to 9pm      Sat & Sun: 5pm  9pm  National suicide prevention lines:                             8-497-KADPHHN (8-698-857-617.738.1373)       9-776-094-TALK ([phone])   24/7 Crisis Text Line:  Text HOME to 426449    Discharge Medication List and Instructions:   Current Discharge Medication List          Unresulted Labs (24h ago, onward) Comment    None        To obtain results of studies pending at discharge, please contact N/A. Follow-up Information     Follow up With Specialties Details Why [address]  On 6/29/2021 Medication management virtual appointment on Tuesday, June 29th at 10:45AM. They will call you the day before to confirm appointment. [address] [facility],[address] [place] [address] [address]  Call Sumanth Arrieta  [phone]  Fax: [phone].  [facility] Geovany Rabago follow up appointment today, Friday May 28th at 4:00PM.  Address: [address] [place] [address], [place] [address]  Phone: [phone]          Advanced Directive:   Does the patient have an appointed surrogate decision maker? Unknown   Does the patient have a Medical Advance Directive? Unknown   Does the patient have a Psychiatric Advance Directive? Unknown   If the patient does not have a surrogate or Medical Advance Directive AND Psychiatric Advance Directive, the patient was offered information on these advance directives. Unknown     Patient Instructions: Please continue all medications until otherwise directed by physician. Tobacco Cessation Discharge Plan:   Is the patient a smoker and needs referral for smoking cessation? No  Patient referred to the following for smoking cessation with an appointment? No   Patient was offered medication to assist with smoking cessation at discharge? No  Was education for smoking cessation added to the discharge instructions? No     Alcohol/Substance Abuse Discharge Plan:   Does the patient have a history of substance/alcohol abuse and requires a referral for treatment? Yes  Patient referred to the following for substance/alcohol abuse treatment with an appointment? Yes  Patient was offered medication to assist with alcohol cessation at discharge? No  Was education for substance/alcohol abuse added to discharge instructions? Yes     Patient discharged to Home; provided to the patient/caregiver either in hard copy or electronically. Continuing care paperwork was faxed to community mental health providers.

## 2021-05-28 NOTE — PROGRESS NOTES
Problem: Depressed Mood (Adult/Pediatric)  Goal: *STG: Remains safe in hospital  5/28/2021 0920 by Merrianne Dakins  Outcome: Progressing Towards Goal  5/28/2021 0914 by Merrianne Dakins  Outcome: Progressing Towards Goal

## 2021-05-28 NOTE — SUICIDE SAFETY PLAN
SAFETY PLAN    A suicide Safety Plan is a document that supports someone when they are having thoughts of suicide. Warning Signs that indicate a suicidal crisis may be developing: What (situations, thoughts, feelings, body sensations, behaviors, etc.) do you experience that lets you know you are beginning to think about suicide? 1. Self harm  2. Heavy drinking  3. Hopelessness    Internal Coping Strategies:  What things can I do (relaxation techniques, hobbies, physical activities, etc.) to take my mind off my problems without contacting another person? 1. Housework    People and social settings that provide distraction: Who can I call or where can I go to distract me? 1. Name: Wife [phone]    People whom I can ask for help: Who can I call when I need help - for example, friends, family, clergy, someone else? 1. Name: Alexander Ramirez  2. Name: Valdo German [phone]    Professionals or [address] [employer] agencies I can contact during a crisis: Who can I call for help - for example, my doctor, my psychiatrist, my psychologist, a mental health provider, a suicide hotline? [address] [place] [address]   Address: [address] [place] [address]  Phone: [phone]  Fax: [phone]  Crisis number:  [phone]     7. Suicide Prevention Lifeline: 0-262-236-TALK (4156)    4. [address] [place] [address] Emergency Services -  for example, [facility] suicide hotline, [facility] Hotline: [address] [place] [address]   Address: [address] [place] [address], [address] [place] [address] Road  Phone: [phone]  Fax: [phone]  Crisis number:  [phone]     Making the environment safe: How can I make my environment (house/apartment/living space) safer? For example, can I remove guns, medications, and other items? 1. Take medications as prescribed.

## 2022-02-21 ENCOUNTER — HOSPITAL ENCOUNTER (EMERGENCY)
Age: 67
Discharge: PSYCHIATRIC OR SUBSTANCE USE FACILITY | End: 2022-02-22
Attending: EMERGENCY MEDICINE | Admitting: EMERGENCY MEDICINE
Payer: MEDICARE

## 2022-02-21 DIAGNOSIS — R45.851 SUICIDAL IDEATION: Primary | ICD-10-CM

## 2022-02-21 LAB
ALBUMIN SERPL-MCNC: 3.8 G/DL (ref 3.5–5)
ALBUMIN/GLOB SERPL: 1.2 {RATIO} (ref 1.1–2.2)
ALP SERPL-CCNC: 55 U/L (ref 45–117)
ALT SERPL-CCNC: 32 U/L (ref 12–78)
AMPHET UR QL SCN: NEGATIVE
ANION GAP SERPL CALC-SCNC: 8 MMOL/L (ref 5–15)
APAP SERPL-MCNC: <2 UG/ML (ref 10–30)
AST SERPL-CCNC: 21 U/L (ref 15–37)
BARBITURATES UR QL SCN: NEGATIVE
BASOPHILS # BLD: 0 K/UL (ref 0–0.1)
BASOPHILS NFR BLD: 1 % (ref 0–1)
BENZODIAZ UR QL: NEGATIVE
BILIRUB SERPL-MCNC: 0.3 MG/DL (ref 0.2–1)
BUN SERPL-MCNC: 8 MG/DL (ref 6–20)
BUN/CREAT SERPL: 10 (ref 12–20)
CALCIUM SERPL-MCNC: 9.2 MG/DL (ref 8.5–10.1)
CANNABINOIDS UR QL SCN: NEGATIVE
CHLORIDE SERPL-SCNC: 104 MMOL/L (ref 97–108)
CO2 SERPL-SCNC: 26 MMOL/L (ref 21–32)
COCAINE UR QL SCN: NEGATIVE
COMMENT, HOLDF: NORMAL
CREAT SERPL-MCNC: 0.78 MG/DL (ref 0.7–1.3)
DIFFERENTIAL METHOD BLD: ABNORMAL
DRUG SCRN COMMENT,DRGCM: NORMAL
EOSINOPHIL # BLD: 0.1 K/UL (ref 0–0.4)
EOSINOPHIL NFR BLD: 2 % (ref 0–7)
ERYTHROCYTE [DISTWIDTH] IN BLOOD BY AUTOMATED COUNT: 13.2 % (ref 11.5–14.5)
ETHANOL SERPL-MCNC: 57 MG/DL
FLUAV RNA SPEC QL NAA+PROBE: NOT DETECTED
FLUBV RNA SPEC QL NAA+PROBE: NOT DETECTED
GLOBULIN SER CALC-MCNC: 3.3 G/DL (ref 2–4)
GLUCOSE SERPL-MCNC: 264 MG/DL (ref 65–100)
HCT VFR BLD AUTO: 36.6 % (ref 36.6–50.3)
HGB BLD-MCNC: 12.5 G/DL (ref 12.1–17)
IMM GRANULOCYTES # BLD AUTO: 0 K/UL (ref 0–0.04)
IMM GRANULOCYTES NFR BLD AUTO: 0 % (ref 0–0.5)
LYMPHOCYTES # BLD: 1.1 K/UL (ref 0.8–3.5)
LYMPHOCYTES NFR BLD: 30 % (ref 12–49)
MCH RBC QN AUTO: 31.2 PG (ref 26–34)
MCHC RBC AUTO-ENTMCNC: 34.2 G/DL (ref 30–36.5)
MCV RBC AUTO: 91.3 FL (ref 80–99)
METHADONE UR QL: NEGATIVE
MONOCYTES # BLD: 0.3 K/UL (ref 0–1)
MONOCYTES NFR BLD: 9 % (ref 5–13)
NEUTS SEG # BLD: 2.2 K/UL (ref 1.8–8)
NEUTS SEG NFR BLD: 58 % (ref 32–75)
NRBC # BLD: 0 K/UL (ref 0–0.01)
NRBC BLD-RTO: 0 PER 100 WBC
OPIATES UR QL: NEGATIVE
PCP UR QL: NEGATIVE
PLATELET # BLD AUTO: 152 K/UL (ref 150–400)
PMV BLD AUTO: 10.1 FL (ref 8.9–12.9)
POTASSIUM SERPL-SCNC: 3.9 MMOL/L (ref 3.5–5.1)
PROT SERPL-MCNC: 7.1 G/DL (ref 6.4–8.2)
RBC # BLD AUTO: 4.01 M/UL (ref 4.1–5.7)
SALICYLATES SERPL-MCNC: <1.7 MG/DL (ref 2.8–20)
SAMPLES BEING HELD,HOLD: NORMAL
SARS-COV-2, COV2: NOT DETECTED
SODIUM SERPL-SCNC: 138 MMOL/L (ref 136–145)
WBC # BLD AUTO: 3.7 K/UL (ref 4.1–11.1)

## 2022-02-21 PROCEDURE — 80307 DRUG TEST PRSMV CHEM ANLYZR: CPT

## 2022-02-21 PROCEDURE — 80179 DRUG ASSAY SALICYLATE: CPT

## 2022-02-21 PROCEDURE — 85025 COMPLETE CBC W/AUTO DIFF WBC: CPT

## 2022-02-21 PROCEDURE — 99284 EMERGENCY DEPT VISIT MOD MDM: CPT

## 2022-02-21 PROCEDURE — 36415 COLL VENOUS BLD VENIPUNCTURE: CPT

## 2022-02-21 PROCEDURE — 90791 PSYCH DIAGNOSTIC EVALUATION: CPT

## 2022-02-21 PROCEDURE — 80143 DRUG ASSAY ACETAMINOPHEN: CPT

## 2022-02-21 PROCEDURE — 80053 COMPREHEN METABOLIC PANEL: CPT

## 2022-02-21 PROCEDURE — 87636 SARSCOV2 & INF A&B AMP PRB: CPT

## 2022-02-21 PROCEDURE — 82077 ASSAY SPEC XCP UR&BREATH IA: CPT

## 2022-02-21 RX ORDER — INSULIN LISPRO 100 [IU]/ML
5 INJECTION, SOLUTION INTRAVENOUS; SUBCUTANEOUS ONCE
Status: COMPLETED | OUTPATIENT
Start: 2022-02-21 | End: 2022-02-22

## 2022-02-21 NOTE — ED TRIAGE NOTES
Triage: PT arrives ambulatory from home via EMS with CC of SI with a plan to stab himself in the heart. Pt has hx of schizoprenia and MDD. Pt reports his wife  on hospice yesterday and is having difficulty coping.

## 2022-02-22 ENCOUNTER — HOSPITAL ENCOUNTER (INPATIENT)
Age: 67
LOS: 1 days | Discharge: HOME | DRG: 885 | End: 2022-02-23
Attending: PSYCHIATRY & NEUROLOGY | Admitting: PSYCHIATRY & NEUROLOGY
Payer: MEDICARE

## 2022-02-22 VITALS
RESPIRATION RATE: 16 BRPM | DIASTOLIC BLOOD PRESSURE: 69 MMHG | OXYGEN SATURATION: 94 % | SYSTOLIC BLOOD PRESSURE: 143 MMHG | TEMPERATURE: 98.1 F | HEART RATE: 58 BPM

## 2022-02-22 DIAGNOSIS — F43.21 ADJUSTMENT DISORDER WITH DEPRESSED MOOD: ICD-10-CM

## 2022-02-22 DIAGNOSIS — F25.1 SCHIZOAFFECTIVE DISORDER, DEPRESSIVE TYPE (HCC): ICD-10-CM

## 2022-02-22 PROBLEM — F25.9 SCHIZOAFFECTIVE DISORDER (HCC): Status: ACTIVE | Noted: 2022-02-22

## 2022-02-22 LAB
GLUCOSE BLD STRIP.AUTO-MCNC: 299 MG/DL (ref 65–117)
GLUCOSE BLD STRIP.AUTO-MCNC: 334 MG/DL (ref 65–117)
GLUCOSE BLD STRIP.AUTO-MCNC: 439 MG/DL (ref 65–117)
SERVICE CMNT-IMP: ABNORMAL

## 2022-02-22 PROCEDURE — 74011250637 HC RX REV CODE- 250/637: Performed by: PSYCHIATRY & NEUROLOGY

## 2022-02-22 PROCEDURE — 74011636637 HC RX REV CODE- 636/637: Performed by: PSYCHIATRY & NEUROLOGY

## 2022-02-22 PROCEDURE — 82962 GLUCOSE BLOOD TEST: CPT

## 2022-02-22 PROCEDURE — 99223 1ST HOSP IP/OBS HIGH 75: CPT | Performed by: PSYCHIATRY & NEUROLOGY

## 2022-02-22 PROCEDURE — 65220000003 HC RM SEMIPRIVATE PSYCH

## 2022-02-22 PROCEDURE — 74011636637 HC RX REV CODE- 636/637: Performed by: EMERGENCY MEDICINE

## 2022-02-22 RX ORDER — SERTRALINE HYDROCHLORIDE 50 MG/1
50 TABLET, FILM COATED ORAL DAILY
COMMUNITY
End: 2022-02-23

## 2022-02-22 RX ORDER — ACETAMINOPHEN 325 MG/1
650 TABLET ORAL
Status: DISCONTINUED | OUTPATIENT
Start: 2022-02-22 | End: 2022-02-22

## 2022-02-22 RX ORDER — OLANZAPINE 2.5 MG/1
2.5 TABLET ORAL
Status: DISCONTINUED | OUTPATIENT
Start: 2022-02-22 | End: 2022-02-23 | Stop reason: HOSPADM

## 2022-02-22 RX ORDER — IBUPROFEN 200 MG
1 TABLET ORAL DAILY
Status: DISCONTINUED | OUTPATIENT
Start: 2022-02-22 | End: 2022-02-23 | Stop reason: HOSPADM

## 2022-02-22 RX ORDER — BENZTROPINE MESYLATE 1 MG/1
0.5 TABLET ORAL
Status: DISCONTINUED | OUTPATIENT
Start: 2022-02-22 | End: 2022-02-23 | Stop reason: HOSPADM

## 2022-02-22 RX ORDER — TRAZODONE HYDROCHLORIDE 50 MG/1
50 TABLET ORAL
Status: DISCONTINUED | OUTPATIENT
Start: 2022-02-22 | End: 2022-02-22

## 2022-02-22 RX ORDER — ATORVASTATIN CALCIUM 20 MG/1
20 TABLET, FILM COATED ORAL
COMMUNITY

## 2022-02-22 RX ORDER — HALOPERIDOL 5 MG/ML
2.5 INJECTION INTRAMUSCULAR
Status: DISCONTINUED | OUTPATIENT
Start: 2022-02-22 | End: 2022-02-23 | Stop reason: HOSPADM

## 2022-02-22 RX ORDER — ACETAMINOPHEN 325 MG/1
650 TABLET ORAL
Status: DISCONTINUED | OUTPATIENT
Start: 2022-02-22 | End: 2022-02-23 | Stop reason: HOSPADM

## 2022-02-22 RX ORDER — OLANZAPINE 2.5 MG/1
2.5 TABLET ORAL DAILY
COMMUNITY
End: 2022-02-23

## 2022-02-22 RX ORDER — HYDROXYZINE 25 MG/1
50 TABLET, FILM COATED ORAL
Status: DISCONTINUED | OUTPATIENT
Start: 2022-02-22 | End: 2022-02-22

## 2022-02-22 RX ORDER — MELATONIN
5000 DAILY
Status: DISCONTINUED | OUTPATIENT
Start: 2022-02-23 | End: 2022-02-23 | Stop reason: HOSPADM

## 2022-02-22 RX ORDER — LISINOPRIL 10 MG/1
10 TABLET ORAL DAILY
COMMUNITY

## 2022-02-22 RX ORDER — BENZTROPINE MESYLATE 1 MG/1
1 TABLET ORAL
Status: DISCONTINUED | OUTPATIENT
Start: 2022-02-22 | End: 2022-02-22

## 2022-02-22 RX ORDER — CLOPIDOGREL BISULFATE 75 MG/1
75 TABLET ORAL DAILY
Status: DISCONTINUED | OUTPATIENT
Start: 2022-02-23 | End: 2022-02-23 | Stop reason: HOSPADM

## 2022-02-22 RX ORDER — DIPHENHYDRAMINE HYDROCHLORIDE 50 MG/ML
25 INJECTION, SOLUTION INTRAMUSCULAR; INTRAVENOUS
Status: DISCONTINUED | OUTPATIENT
Start: 2022-02-22 | End: 2022-02-23 | Stop reason: HOSPADM

## 2022-02-22 RX ORDER — DIPHENHYDRAMINE HYDROCHLORIDE 50 MG/ML
50 INJECTION, SOLUTION INTRAMUSCULAR; INTRAVENOUS
Status: DISCONTINUED | OUTPATIENT
Start: 2022-02-22 | End: 2022-02-22

## 2022-02-22 RX ORDER — METFORMIN HYDROCHLORIDE 500 MG/1
1000 TABLET ORAL 2 TIMES DAILY WITH MEALS
Status: DISCONTINUED | OUTPATIENT
Start: 2022-02-22 | End: 2022-02-23 | Stop reason: HOSPADM

## 2022-02-22 RX ORDER — OLANZAPINE 5 MG/1
5 TABLET ORAL
Status: DISCONTINUED | OUTPATIENT
Start: 2022-02-22 | End: 2022-02-22

## 2022-02-22 RX ORDER — ADHESIVE BANDAGE
30 BANDAGE TOPICAL DAILY PRN
Status: DISCONTINUED | OUTPATIENT
Start: 2022-02-22 | End: 2022-02-22

## 2022-02-22 RX ORDER — OLANZAPINE 2.5 MG/1
2.5 TABLET ORAL DAILY
Status: DISCONTINUED | OUTPATIENT
Start: 2022-02-23 | End: 2022-02-22

## 2022-02-22 RX ORDER — DEXTROSE 50 % IN WATER (D50W) INTRAVENOUS SYRINGE
25-50 AS NEEDED
Status: DISCONTINUED | OUTPATIENT
Start: 2022-02-22 | End: 2022-02-23 | Stop reason: HOSPADM

## 2022-02-22 RX ORDER — SERTRALINE HYDROCHLORIDE 50 MG/1
100 TABLET, FILM COATED ORAL DAILY
Status: DISCONTINUED | OUTPATIENT
Start: 2022-02-23 | End: 2022-02-23 | Stop reason: HOSPADM

## 2022-02-22 RX ORDER — PERPHENAZINE 8 MG/1
8 TABLET, FILM COATED ORAL
COMMUNITY

## 2022-02-22 RX ORDER — ASPIRIN 81 MG/1
81 TABLET ORAL DAILY
Status: DISCONTINUED | OUTPATIENT
Start: 2022-02-23 | End: 2022-02-23 | Stop reason: HOSPADM

## 2022-02-22 RX ORDER — HALOPERIDOL 5 MG/ML
5 INJECTION INTRAMUSCULAR
Status: DISCONTINUED | OUTPATIENT
Start: 2022-02-22 | End: 2022-02-22

## 2022-02-22 RX ORDER — METFORMIN HYDROCHLORIDE 1000 MG/1
1000 TABLET ORAL 2 TIMES DAILY WITH MEALS
COMMUNITY

## 2022-02-22 RX ORDER — LORAZEPAM 2 MG/ML
1 INJECTION INTRAMUSCULAR
Status: DISCONTINUED | OUTPATIENT
Start: 2022-02-22 | End: 2022-02-22

## 2022-02-22 RX ORDER — MAGNESIUM SULFATE 100 %
4 CRYSTALS MISCELLANEOUS AS NEEDED
Status: DISCONTINUED | OUTPATIENT
Start: 2022-02-22 | End: 2022-02-23 | Stop reason: HOSPADM

## 2022-02-22 RX ORDER — PANTOPRAZOLE SODIUM 40 MG/1
40 TABLET, DELAYED RELEASE ORAL
Status: DISCONTINUED | OUTPATIENT
Start: 2022-02-23 | End: 2022-02-23 | Stop reason: HOSPADM

## 2022-02-22 RX ORDER — ADHESIVE BANDAGE
30 BANDAGE TOPICAL DAILY PRN
Status: DISCONTINUED | OUTPATIENT
Start: 2022-02-22 | End: 2022-02-23 | Stop reason: HOSPADM

## 2022-02-22 RX ORDER — INSULIN GLARGINE 100 [IU]/ML
20 INJECTION, SOLUTION SUBCUTANEOUS DAILY
Status: DISCONTINUED | OUTPATIENT
Start: 2022-02-22 | End: 2022-02-23 | Stop reason: HOSPADM

## 2022-02-22 RX ORDER — LISINOPRIL 5 MG/1
10 TABLET ORAL DAILY
Status: DISCONTINUED | OUTPATIENT
Start: 2022-02-23 | End: 2022-02-23 | Stop reason: HOSPADM

## 2022-02-22 RX ORDER — ATORVASTATIN CALCIUM 10 MG/1
20 TABLET, FILM COATED ORAL
Status: DISCONTINUED | OUTPATIENT
Start: 2022-02-22 | End: 2022-02-23 | Stop reason: HOSPADM

## 2022-02-22 RX ORDER — HYDROXYZINE 50 MG/1
50 TABLET, FILM COATED ORAL
COMMUNITY

## 2022-02-22 RX ORDER — PERPHENAZINE 4 MG/1
8 TABLET, FILM COATED ORAL
Status: DISCONTINUED | OUTPATIENT
Start: 2022-02-22 | End: 2022-02-23 | Stop reason: HOSPADM

## 2022-02-22 RX ADMIN — METFORMIN HYDROCHLORIDE 1000 MG: 500 TABLET, FILM COATED ORAL at 16:31

## 2022-02-22 RX ADMIN — Medication 20 UNITS: at 16:31

## 2022-02-22 RX ADMIN — ATORVASTATIN CALCIUM 20 MG: 10 TABLET, FILM COATED ORAL at 21:07

## 2022-02-22 RX ADMIN — Medication 5 UNITS: at 00:22

## 2022-02-22 RX ADMIN — PERPHENAZINE 8 MG: 4 TABLET, FILM COATED ORAL at 21:07

## 2022-02-22 RX ADMIN — OLANZAPINE 2.5 MG: 2.5 TABLET, FILM COATED ORAL at 23:44

## 2022-02-22 NOTE — ED PROVIDER NOTES
48-year-old male with history of bipolar, depression, diabetes, EtOH abuse, hypertension, stroke presents to the emergency department chief complaint of suicidal ideation. He tells me he wants to stab himself in the heart. His wife  yesterday. He denies having anything to harm self. Tells me has been recently hospitalized for psychiatric illness, most recently a month ago at [facility].    The history is provided by the patient and medical records. Mental Health Problem   This is a recurrent problem. The problem has not changed since onset. Pertinent negatives include no weakness and no agitation. His past medical history is significant for depression and psychotropic medication treatment. Past Medical History:   Diagnosis Date    Bipolar 2 disorder ([facility]     Depression     DM (diabetes mellitus) ([facility]     ETOH abuse     HTN (hypertension)     Stroke [facility])        Past Surgical History:   Procedure Laterality Date    HX HERNIA REPAIR           No family history on file.     Social History     Socioeconomic History    Marital status:      Spouse name: Not on file    Number of children: Not on file    Years of education: Not on file    Highest education level: Not on file   Occupational History    Not on file   Tobacco Use    Smoking status: Current Every Day Smoker     Packs/day: 1.00    Smokeless tobacco: Never Used   Substance and Sexual Activity    Alcohol use: Yes     Comment: drinks a fifth of wehiskey and a case a beer per day    Drug use: No    Sexual activity: Not on file   Other Topics Concern    Not on file   Social History Narrative    Not on file     Social Determinants of Health     Financial Resource Strain:     Difficulty of Paying Living Expenses: Not on file   Food Insecurity:     Worried About Running Out of Food in the Last Year: Not on file    Dulce Maria of Food in the Last Year: Not on file   Transportation Needs:     Lack of Transportation (Medical): Not on file    Lack of Transportation (Non-Medical): Not on file   Physical Activity:     Days of Exercise per Week: Not on file    Minutes of Exercise per Session: Not on file   Stress:     Feeling of Stress : Not on file   Social Connections:     Frequency of Communication with Friends and Family: Not on file    Frequency of Social Gatherings with Friends and Family: Not on file    Attends Mormonism Services: Not on file    Active Member of [address] [place] [address] [employer] or Organizations: Not on file    Attends Club or Organization Meetings: Not on file    Marital Status: Not on file   Intimate Partner Violence:     Fear of Current or Ex-Partner: Not on file    Emotionally Abused: Not on file    Physically Abused: Not on file    Sexually Abused: Not on file   Housing Stability:     Unable to Pay for Housing in the Last Year: Not on file    Number of [place] in the Last Year: Not on file    Unstable Housing in the Last Year: Not on file         ALLERGIES: Egg    Review of Systems   Constitutional: Negative for fatigue and fever. HENT: Negative for sneezing and sore throat. Respiratory: Negative for cough and shortness of breath. Cardiovascular: Negative for chest pain and leg swelling. Gastrointestinal: Negative for abdominal pain, diarrhea, nausea and vomiting. Genitourinary: Negative for difficulty urinating and dysuria. Musculoskeletal: Negative for arthralgias and myalgias. Skin: Negative for color change and rash. Neurological: Negative for weakness and headaches. Psychiatric/Behavioral: Positive for dysphoric mood and suicidal ideas. Negative for agitation and behavioral problems. Vitals:    02/21/22 1806   BP: (!) 154/86   Pulse: 78   Resp: 16   Temp: 98.3 °F (36.8 °C)   SpO2: 100%            Physical Exam  Vitals and nursing note reviewed. Constitutional:       General: He is not in acute distress. Appearance: Normal appearance. He is well-developed. He is obese.  He is not ill-appearing, toxic-appearing or diaphoretic. HENT:      Head: Normocephalic and atraumatic. Nose: Nose normal.      Mouth/Throat:      Mouth: Mucous membranes are moist.      Pharynx: Oropharynx is clear. Eyes:      Extraocular Movements: Extraocular movements intact. Conjunctiva/sclera: Conjunctivae normal.      Pupils: Pupils are equal, round, and reactive to light. Cardiovascular:      Rate and Rhythm: Normal rate and regular rhythm. Pulses: Normal pulses. Heart sounds: No murmur heard. Pulmonary:      Effort: Pulmonary effort is normal. No respiratory distress. Breath sounds: Normal breath sounds. No wheezing. Chest:      Chest wall: No mass or tenderness. Abdominal:      General: There is no distension. Palpations: Abdomen is soft. Tenderness: There is no abdominal tenderness. There is no guarding or rebound. Musculoskeletal:         General: No swelling, tenderness, deformity or signs of injury. Normal range of motion. Cervical back: Normal range of motion and neck supple. No rigidity. No muscular tenderness. Right lower leg: No tenderness. No edema. Left lower leg: No tenderness. No edema. Skin:     General: Skin is warm and dry. Capillary Refill: Capillary refill takes less than 2 seconds. Neurological:      General: No focal deficit present. Mental Status: He is alert and oriented to person, place, and time. Psychiatric:         Mood and Affect: Mood normal.         Behavior: Behavior normal.          MDM  Number of Diagnoses or Management Options  Suicidal ideation  Diagnosis management comments: 53-year-old male presents as above with suicidal ideation. Seen by [facility]. Voluntary admission.        Amount and/or Complexity of Data Reviewed  Clinical lab tests: reviewed           Procedures

## 2022-02-22 NOTE — GROUP NOTE
SOHAIL  GROUP DOCUMENTATION INDIVIDUAL                                                                          Group Therapy Note    Date: 2/22/2022    Group Start Time: 1400  Group End Time: 1500  Group Topic: Recreational/Music Therapy    [facility] [place] [address] ACUTE BEHAV [facility]    [place], [address] [facility] GROUP DOCUMENTATION GROUP    Group Therapy Note    Attendees: 7         Attendance: Attended    Patient's Goal:  To concentrate on selected task    Interventions/techniques: Supported-crafts,games,music    Interactions: Interacted appropriately    Mental Status: Calm    Behavior/appearance: Needed prompting    Goals Achieved: Able to engage in interactions and Able to listen to others-listened to music      Christina Fernandez

## 2022-02-22 NOTE — BH NOTES
GROUP THERAPY PROGRESS NOTE    Patient did not participate in Substance abuse/Coping Skills group.      Marjan Solders LPC LSATP [facility]

## 2022-02-22 NOTE — ED NOTES
Patient moved from hallway to room 14. Patient is calm and cooperative and sitter is in room with him.

## 2022-02-22 NOTE — PROGRESS NOTES
Spiritual Care Assessment/Progress Note  Edgar Zaman      NAME: Andrei Rock      MRN: [id number]  AGE: 78 y.o. SEX: male  [place] Affiliation: Tenriism   Language: English     2/22/2022     Total Time (in minutes): 17     Spiritual Assessment begun in [place] [address] [address] through conversation with:         []Patient        [] Family    [] Friend(s)        Reason for Consult: Advance medical directive consult     Spiritual beliefs: (Please include comment if needed)     [] Identifies with a ting tradition:         [] Supported by a ting community:            [] Claims no spiritual orientation:           [] Seeking spiritual identity:                [] Adheres to an individual form of spirituality:           [x] Not able to assess:                           Identified resources for coping:      [] Prayer                               [] Music                  [] Guided Imagery     [] Family/friends                 [] Pet visits     [] Devotional reading                         [x] Unknown     [] Other:                                              Interventions offered during this visit: (See comments for more details)    Patient Interventions: Advance medical directive consult,Initial visit           Plan of Care:     [] Support spiritual and/or cultural needs    [x] Support AMD and/or advance care planning process      [] Support grieving process   [] Coordinate Rites and/or Rituals    [] Coordination with community clergy   [] No spiritual needs identified at this time   [] Detailed Plan of Care below (See Comments)  [] Make referral to Music Therapy  [] Make referral to Pet Therapy     [] Make referral to Addiction services  [] Make referral to [facility]  [] Make referral to Spiritual Care Partner  [] No future visits requested        [x] Contact Spiritual Care for further referrals     Comments:      responded to AMD assistance request for Mr. Hernandez in 320.  Reviewed the chart notes before visiting the pt. According to the chart notes, pt is psyche patient. Upon arrival, pt was appeared to be sleeping.  left the AMD form and booklet in his shelf.       [address] [facility] [place] Lani Fleming,Th.M, Isai[address] Provider   Paging Service [phone]

## 2022-02-22 NOTE — PROGRESS NOTES
[facility] Admission Pharmacy Medication Reconciliation     Information obtained from: [facility] pharmacy ([phone]), patient interview  RxQuery data available1: No    Comments/recommendations:  Patient reports not taking care of himself since his wife passed and has been non-compliant with medications for the past several weeks. Medication changes (since last review): Added  Insulin NPH/Insulin regular 70/30 - patient & pharmacy confirmed dose of 18 units SQ BID  Olanzapine  Hydroxyzine HCl   Removed  Insulin glargine  Naltrexone  Adjusted  Atorvastatin - dose adjusted from 40 mg to 20 mg  Lisinopril - dose adjusted from 40 mg to 10 mg  Perphenazine - dose adjusted from 4 mg to 8 mg  Sertraline - dose adjusted from 200 mg to 50 mg    The [employer] [place] ([employer]) was accessed to determine fill history of any controlled medications. No controlled medications filled within the past 2 years. 1RxQuery pharmacy benefit data reflects medications filled and processed through the patient's insurance, however                this data does NOT capture whether the medication was picked up or is currently being taken by the patient. Total Time Spent: 20 minutes    Past Medical History/Disease States:  Past Medical History:   Diagnosis Date    Bipolar 2 disorder ([facility]     Depression     DM (diabetes mellitus) ([facility]     ETOH abuse     HTN (hypertension)     Stroke [facility])          Patient allergies: Allergies as of 02/21/2022 - Fully Reviewed 02/21/2022   Allergen Reaction Noted    Egg Swelling 09/05/2012       Prior to Admission Medications   Prescriptions Last Dose Informant Patient Reported? Taking? OLANZapine (ZyPREXA) 2.5 mg tablet  Other Yes Yes   Sig: Take 2.5 mg by mouth daily. aspirin delayed-release 81 mg tablet  Self No Yes   Sig: Take 1 Tablet by mouth daily.  Indications: prevention of transient ischemic attack   atorvastatin (LIPITOR) 20 mg tablet  Other Yes Yes   Sig: Take 20 mg by mouth nightly. cholecalciferol (VITAMIN D3) (5000 Units/125 mcg) tab tablet  Self No Yes   Sig: Take 1 Tablet by mouth daily. Indications: prevention of vitamin D deficiency   clopidogreL (PLAVIX) 75 mg tab  Other No Yes   Sig: Take 1 Tablet by mouth daily. Indications: prevention for a blood clot going to the brain   hydrOXYzine HCL (ATARAX) 50 mg tablet  Other Yes Yes   Sig: Take 50 mg by mouth every four (4) hours as needed for Anxiety. insulin NPH/insulin regular (NovoLIN 70/30 U-100 Insulin) 100 unit/mL (70-30) injection  Self Yes Yes   Si Units by SubCUTAneous route Before breakfast and dinner. Indications: type 2 diabetes mellitus   lisinopriL (PRINIVIL, ZESTRIL) 10 mg tablet  Other Yes Yes   Sig: Take 10 mg by mouth daily. metFORMIN (GLUCOPHAGE) 1,000 mg tablet  Self Yes Yes   Sig: Take 1,000 mg by mouth two (2) times daily (with meals). pantoprazole (PROTONIX) 40 mg tablet  Other No Yes   Sig: Take 1 Tablet by mouth daily. Indications: gastroesophageal reflux disease   perphenazine (TRILAFON) 8 mg tablet  Other Yes Yes   Sig: Take 8 mg by mouth nightly. sertraline (ZOLOFT) 50 mg tablet  Other Yes Yes   Sig: Take 50 mg by mouth daily. traZODone (DESYREL) 100 mg tablet  Other No Yes   Sig: Take 1 Tablet by mouth nightly.  Indications: insomnia      Facility-Administered Medications: None         Thank you,  LOIDA Tobias [facility] [place], [address] [place] [address] Avenue [address]: [phone] ([id number])  Pharmacy: [phone] ([id number])

## 2022-02-22 NOTE — GROUP NOTE
SOHAIL  GROUP DOCUMENTATION INDIVIDUAL                                                                          Group Therapy Note    Date: 2/22/2022    Group Start Time: 1000  Group End Time: 1100  Group Topic: Topic Group    [facility] 3 ACUTE BEHAV [facility]    [place], [address] [facility] GROUP DOCUMENTATION GROUP    Group Therapy Note    Attendees: 4         Attendance: Attended    Patient's Goal:  To participate in self esteem booster    Interventions/techniques: Supported-identifying confidence builders    Follows Directions: Followed directions with prompts    Interactions: Disorganized interaction    Mental Status: Manic    Behavior/appearance: Cooperative and Needed prompting    Goals Achieved:  Attended group session-participated in discussion      Additional Notes:  Hyper verbal,intrusive at times    [place] Section

## 2022-02-22 NOTE — PROGRESS NOTES
Problem: Risk of Harm to Self or Others  Goal: STG: Patient will limit number of statements of suicidal ideation or intent per day  Description: Patient will limit number of statements of suicidal ideation or intent per day. Indicate number of statements/day. Outcome: Progressing Towards Goal     Problem: Depressed Mood (Adult/Pediatric)  Goal: *STG: Participates in treatment plan  Outcome: Progressing Towards Goal  Goal: *STG: Verbalizes anger, guilt, and other feelings in a constructive manor  Outcome: Progressing Towards Goal     0700: Shift change report given to Cindy DUQUE (oncoming nurse) by Josph Halsted (offgoing nurse). Report included the following information SBAR, Kardex, MAR and Recent Results. 6368-1972: Assumed care of patient. Met patient in room. Patient calm and cooperative during assessment. A&Ox4. Patient smiling and flirting with writer. Patient denied anxiety, depression, SI, HI, AVH and pain. Patient meal compliant. [id number]: Patient in hallway talking to nursing students. 6461-0925: Meal compliant. Patient given nicotine patch at 1251. Patient up in bed resting quietly. No voiced concerns. 7544-4633: Patient informed staff that he is having a stroke. Patient reports being numb on one side of body. Patient going back between right and left side. Patient then said right side. Neuro checks and VS performed. According to staff, during neuro checks patient switched between right and left side for where he was feeling weakness. 5252-8170: Med and meal compliant. Patient in hallway walking and standing at nurses station.

## 2022-02-22 NOTE — BSMART NOTE
Comprehensive Assessment Form Part 1      Section I - Disposition    DDx:  Acute stress reaction  Schizoaffective DO by history  Alcohol use disorder, moderate, dependence by history  Medical: IDDM, HTN, sleep apnea but never issued/used a CPAP, CVA (date unknown)  Past Medical History:   Diagnosis Date    [facility] 2 [facility] ([facility]     Depression     DM (diabetes mellitus) ([facility]     ETOH abuse     HTN (hypertension)     Stroke [facility])        The Medical Doctor to Psychiatrist conference was not completed. The Medical Doctor is in agreement with [facility] disposition. The on-call Psychiatrist consulted was AKSHAT. The admitting Psychiatrist will be . The admitting Diagnosis is as noted above. The Payor source is [address] [place] [address]. Writer reviewed the [place] Suicide Severity Rating Scale in nursing flowsheet and the risk level is not entered. 2. Based on this assessment, the risk of suicide is high and the plan is to pursue voluntary psychiatric hospitalization. Section II - Integrated Summary  Pt is a 79 y/o male (dx history noted above) known to [facility] Psychiatry from 3 prior hospitalizations arrives at the ED this evening via EMS c/o SI with plan to stab himself in the heart. Pt reportedly was agitated in waiting room, expressed frustration over long wait time, told staff he was leaving and \"if I kill myself it's your fault! \" He was redirected by [address] without incident; off-duty ED [employer] officer took him outside to smoke. Writer met with pt f/f at bedside. He is A&Ox4. Depressed mood with congruent affect. Speech is spontaneous with normal rate, rhythm and volume. Thought process is linear and goal-directed. Thought content is in conjunction with current situation. Memory generally intact though he cannot recall name of his providers or most of his meds. No observable evidence of perceptual disturbance, no internal preoccupation.  No observable evidence of macho or psychosis. Adequate insight. Fair judgment. Contrary to presentation in the waiting room, during interview pt is calm, cooperative, pleasant, engaging. After interview pt asked to go outside to smoke again. ED Officer took him out again but writer reminded pt he would not be able to do this on the [facility]. Pt stated, \"I know. I'll do a patch or something by then. \"  Pt endorses SI with aforementioned plan, dysphoria, hopelessness, insomnia and decreased appetite. Pt attributes these symptoms to grieving over his wife's death. Wife reportedly was in declining health for several months and had frequent hospital visits. She reportedly suffered a heart attack and subsequent brain death. She reportedly  an hour after being moved to hospice. Pt says he was with her when she . Wife's sister is making  arrangements. Pt states he has \"no will to live\" and wants to join his wife. They were reportedly together for 6 years and  for 5. In addition to grieving her death, pt says the loss has triggered memories of his son who  years ago of a drug overdose. Pt reports that while his wife's death yesterday exacerbated his depression, he had already been decompensating for months \"from seeing her get sick and taking care of her. \" He reports that he was admitted at [facility] 1 month ago for SI which he also attributes to concern about wife's health. Pt reports that since that admission he has continued to get worse, not taking his medications (medical and psychotropic), not monitoring his blood sugar, poor sleep, decreased appetite. He denies history of suicide attempts. Pt reports he has been experiencing auditory hallucinations that are worsening in frequency and severity because he is not medication compliant. He describes the [facility] as [employer], noise, I can't hear what it is exactly, it is just getting louder. \"  Pt's current BAL is 57. He reports he drinks 6 beers daily.  He denies any etoh w/d symptoms. He says his drinking has not increased despite exacerbated depressive sx but per EMR he has a history of minimizing his use. Pt denies any history of falls. He ambulates without assistance. He denies any assistive devices. He does his own ADLs. 3  The patient has demonstrated mental capacity to provide informed consent. The information is given by the patient, EMR. The Chief Complaint is as noted above. The Precipitant Factors are as noted above. Previous Hospitalizations: yes  The patient has not previously been in restraints. Current Psychiatrist is at [facility], per pt report. Name of provider unknown. Pt denies any current therapist or CM. Lethality Assessment:    The potential for suicide noted by the following: defined plan, means, ideation and current substance abuse . The potential for homicide is not noted. The patient has not been a perpetrator of sexual or physical abuse. There are not pending charges. The patient is felt to be at risk for self harm or harm to others. The attending nurse was advised pt will be presented for voluntary psych inpt admit. Section III - Psychosocial  The patient's overall mood and attitude is depressed, calm, cooperative. Feelings of helplessness and hopelessness are observed by self report. Generalized anxiety is not observed. Panic is not observed. Phobias are not observed. Obsessive compulsive tendencies are not observed. Section IV - Mental Status Exam  The patient's appearance shows no evidence of impairment. The patient's behavior shows no evidence of impairment. The patient is oriented to time, place, person and situation. The patient's speech shows no evidence of impairment. The patient's mood is depressed. The range of affect shows no evidence of impairment. The patient's thought content demonstrates no evidence of impairment. The thought process shows no evidence of impairment.   The patient's perception shows no evidence of impairment. The patient's memory shows no evidence of impairment. The patient's appetite is decreased. The patient's sleep has evidence of insomnia. The patient's insight shows no evidence of impairment. The patient's judgement shows no evidence of impairment. Section V - Substance Abuse  The patient is using substances. The patient is using alcohol for greater than 10 years with last use on today. The patient has experienced the following withdrawal symptoms: N/A. Section VI - Living Arrangements  The patient is . The patient lives alone. The patient had one son now . The patient does plan to return home upon discharge. The patient does not have legal issues pending. The patient's source of income comes from disability/retired. Druze and cultural practices have not been voiced at this time. The patient's greatest support comes from no one identified. The patient has not been in an event described as horrible or outside the realm of ordinary life experience either currently or in the past.  The patient has not been a victim of sexual/physical abuse. Section VII - Other Areas of Clinical Concern  The highest grade achieved is NA with the overall quality of school experience being described as NA. The patient is currently disabled and speaks [place] as a primary language. The patient has no communication impairments affecting communication. The patient's preference for learning can be described as: can read and write adequately.   The patient's hearing is normal.  The patient's vision is normal.      Shwetha Haddad MS

## 2022-02-22 NOTE — ACP (ADVANCE CARE PLANNING)
Advance Care Planning   Advance Care Planning Inpatient Note  [address] [facility]    Today's Date: 2/22/2022  Unit: [address] [facility]    Received request from Jannie Bell RN. Upon review of chart and communication with care team, Spiritual Care will defer Advance Care planning with patient at this time. Patient was/were present in the room during visit. Goals of ACP Conversation:  Pt was appeared to be sleeping    Health Care Decision Makers:    No healthcare decision makers have been documented. Click here to complete [place] including selection of the Healthcare Decision Maker Relationship (ie \"Primary\")    Summary:  No Decision Maker named by patient at this time    Advance Care Planning Documents (Patient Wishes) on file:  None     Assessment:     responded to AMD assistance request for Mr. Hernandez in 320. Reviewed the chart notes before visiting the pt. According to the chart notes, pt is psyche patient. Upon arrival, pt was appeared to be sleeping.  left the AMD form and booklet in his shelf.       Interventions:  Deferred conversation as patient not interested in completing an advance directive at this time    Care Preferences Communicated:  No    Outcomes/Plan:  ACP Discussion [address] on 2/22/2022 at 10:40 AM

## 2022-02-22 NOTE — ED NOTES
Care of patient at change of shift. He has been medically cleared by Dr. Bailey Goodman with anticipated admission at [facility].  Per report they did not want admitted there until his blood sugar is less than 200. There is no medically indicated reason to bring his blood sugar less than 200. Patient has not taken his medicine in 24 hours to the death of his wife.

## 2022-02-22 NOTE — BH NOTES
PSYCHOSOCIAL ASSESSMENT  :Patient identifying info:   Cheryl Fisher is a 78 y.o., male admitted 2022  5:35 AM     Presenting problem and precipitating factors: Pt was admitted on a voluntary basis due to SI with plan to kill himself after his wifes . Pt is currently denying this stating he does not want to kill himself and thats why he came to the hospital. Pt's wife passed on      Mental status assessment: Pt was seen in treatment team this morning. Pt is alert and oriented. Pt denies SI/HI. Pt's mood is depressed, affect is WNL. Pt's thought process is logical. Pt's insight and judgment is impaired, reliability is good. Social work department will continue to coordinate discharge plans. Strengths: family support and seeking help      Collateral information: None at this time. Current psychiatric /substance abuse providers and contact info: None     Previous psychiatric/substance abuse providers and response to treatment: [facility]. Reports previous admission to [facility] in late  and [facility] . Pt reports OD on sleeping pills at age 15 years. Family history of mental illness or substance abuse: None reported. Substance abuse history: UDS: negative;  BAL=57   Social History     Tobacco Use    Smoking status: Current Every Day Smoker     Packs/day: 1.00    Smokeless tobacco: Never Used   Substance Use Topics    Alcohol use: Yes     Comment: drinks a fifth of wehiskey and a case a beer per day       History of biomedical complications associated with substance abuse : Shaking, no seizures reported. Patient's current acceptance of treatment or motivation for change: Fair     Family constellation:       Is significant other involved? N/A     Describe support system: AA sponsor and cat     Describe living arrangements and home environment: Pt lives alone.       GUARDIAN/POA: No    Guardian Name: N/A    Guardian Contact: N/A    Health issues:   Hospital Problems  Date Reviewed: 2017          Codes Class Noted POA    * (Principal) Schiz[facility]ffective [facility] ICD-10-CM: F25.9  ICD-9-CM: 295.70  2022 Unknown            Trauma history: None reported     Legal issues: None reported     History of  service: None     Financial status: unknown at this time. Confucianism/cultural factors: None expressed at this time. Education/work history: 9th grade education. Have you been licensed as a health care professional (current or ): No     Leisure and recreation preferences: Unknown      Describe coping skills: ineffectual at this time.     Brenda Hannah  2022

## 2022-02-22 NOTE — BH NOTES
[id number]: Patient 78years old male admitted thru [facility] ED. Patient self checked in ED after his wife dies a few day sago . Patient has previous Psychiatric Hx with past inpatient admission and hx. Of past S/I. Patient came  in with suicidal thoughts with plan  to harm self. Patient is voiving desire to die with wife. Pateint is cam and cooperative on admission to Unit. Patient reports He has Not taken any medication for  past weeks as He says was  taking care of sick wife. Dual Skin check  By Kenny Ridley RN, skin is intact except for  tattoo on right lower arm an old surgical incision scar to mid upper abdomen. Patient oriented to room and place in room on in of sight observation. . Patient say burial is on March 1st.tient denies self harm at this time .  \" I will not do any thing to my self until after I bury my wife\"

## 2022-02-22 NOTE — H&P
INITIAL PSYCHIATRIC EVALUATION            IDENTIFICATION:    Patient Name  Cheryl Fisher   Date of Birth 1955   [facility] [id number]   Medical Record Number  [id number]      Age  78 y.o. PCP Skiff, Orlando Pleas, NP   Admit date:  2022    Room Number  320/02  @ [facility] (formerly [facility])   Date of Service  2022            HISTORY         REASON FOR HOSPITALIZATION:  CC: \"suicidal ideation\". Pt admitted under a voluntary basis for suicidal ideations proving to be an imminent danger to self and an inability to care for self. HISTORY OF PRESENT ILLNESS:    The patient, Cheryl Fisher, is a 78 y.o. WHITE/NON- male with a past psychiatric history significant for schizoaffective disorder, depressed mood, who presents at this time with complaints of (and/or evidence of) the following emotional symptoms: depression and suicidal thoughts/threats. Additional symptomatology include poor self care. The above symptoms have been present for 3 days. These symptoms are of moderate to high severity. These symptoms are ntermittent/ fleeting in nature. The patient's condition has been precipitated by psychosocial stressors (the death of his wife a few days prior to admission). UDS: negative; BAL=0. The patient is well-known to the unit, and presented approx 6 months earlier in crisis without any specific trigger. He was discharged home with outpatient follow up and had been doing well until his wife passed away. He made comments that he would want to get his wife's affairs in order and then take his own life after her .     The patient is a fair historian. The patient corroborates the above narrative. The patient contracts for safety on the unit and gives consent for the team to contact collateral. The patient is amenable to initiating treatment while on the unit. He agrees to an increase in his antidepressant given ongoing dysphoria since his wife .      ALLERGIES:   Allergies   Allergen Reactions  Egg Swelling      MEDICATIONS PRIOR TO ADMISSION:   Medications Prior to Admission   Medication Sig    atorvastatin (LIPITOR) 20 mg tablet Take 20 mg by mouth nightly.  perphenazine (TRILAFON) 8 mg tablet Take 8 mg by mouth nightly.  sertraline (ZOLOFT) 50 mg tablet Take 50 mg by mouth daily.  lisinopriL (PRINIVIL, ZESTRIL) 10 mg tablet Take 10 mg by mouth daily.  hydrOXYzine HCL (ATARAX) 50 mg tablet Take 50 mg by mouth every four (4) hours as needed for Anxiety.  OLANZapine (ZyPREXA) 2.5 mg tablet Take 2.5 mg by mouth daily.  metFORMIN (GLUCOPHAGE) 1,000 mg tablet Take 1,000 mg by mouth two (2) times daily (with meals).  insulin NPH/insulin regular (NovoLIN 70/30 U-100 Insulin) 100 unit/mL (70-30) injection 18 Units by SubCUTAneous route Before breakfast and dinner. Indications: type 2 diabetes mellitus    clopidogreL (PLAVIX) 75 mg tab Take 1 Tablet by mouth daily. Indications: prevention for a blood clot going to the brain    traZODone (DESYREL) 100 mg tablet Take 1 Tablet by mouth nightly. Indications: insomnia    aspirin delayed-release 81 mg tablet Take 1 Tablet by mouth daily. Indications: prevention of transient ischemic attack    cholecalciferol (VITAMIN D3) (5000 Units/125 mcg) tab tablet Take 1 Tablet by mouth daily. Indications: prevention of vitamin D deficiency    pantoprazole (PROTONIX) 40 mg tablet Take 1 Tablet by mouth daily.  Indications: gastroesophageal reflux disease      PAST MEDICAL HISTORY:   Past Medical History:   Diagnosis Date    Bipolar 2 disorder ([facility] [address] [facility]     Depression     [facility]     ETOH abuse     HTN (hypertension)     Stroke ([facility]      Past Surgical History:   Procedure Laterality Date    HX HERNIA REPAIR        SOCIAL HISTORY:  The patient is currently retired; the patient smokes 2-3 ppd; the patient's marital status is ; the patient does not have children; the patient reports the highest level of education achieved is 9th grade. He lives alone. FAMILY HISTORY: History reviewed, pertinent family history as below:   No family history on file. REVIEW OF SYSTEMS:   Pertinent items are noted in the History of Present Illness. All other Systems reviewed and are considered negative. MENTAL STATUS EXAM & VITALS     MENTAL STATUS EXAM (MSE):    MSE FINDINGS ARE WITHIN NORMAL LIMITS (WNL) UNLESS OTHERWISE STATED BELOW. ( ALL OF THE BELOW CATEGORIES OF THE MSE HAVE BEEN REVIEWED (reviewed 2/23/2022) AND UPDATED AS DEEMED APPROPRIATE )  General Presentation age appropriate, cooperative   Orientation oriented to time, place and person   Vital Signs  See below (reviewed 2/23/2022); Vital Signs (BP, Pulse, & Temp) are within normal limits if not listed below.    Gait and Station Stable/steady, no ataxia   Musculoskeletal System No extrapyramidal symptoms (EPS); no abnormal muscular movements or Tardive Dyskinesia (TD); muscle strength and tone are within normal limits   Language No aphasia or dysarthria   Speech:  normal volume and non-pressured   Thought Processes simple; normal rate of thoughts; fair abstract reasoning/computation   Thought Associations goal directed   Thought Content free of hallucinations and not internally preoccupied   Suicidal Ideations contracts for safety   Homicidal Ideations none   Mood:  depressed   Affect:  euthymic and mood-incongruent   Memory recent  intact   Memory remote:  intact   Concentration/Attention:  intact   Fund of Knowledge average   Insight:  limited   Reliability fair   Judgment:  limited          VITALS:     Patient Vitals for the past 24 hrs:   Temp Pulse Resp BP SpO2   02/23/22 0820  61  (!) 168/113    02/23/22 0803 97.5 °F (36.4 °C) (!) 51 16 134/62 96 %   02/22/22 2030    (!) 148/90    02/22/22 1951 97.9 °F (36.6 °C) 65 18 (!) 185/108 99 %   02/22/22 1626  65  (!) 133/59 95 %   02/22/22 1550  67  130/63 97 %     Wt Readings from Last 3 Encounters:   02/22/22 105.7 kg (233 lb)   05/23/21 110.7 kg (244 lb)   08/08/17 109.8 kg (242 lb)     Temp Readings from Last 3 Encounters:   02/23/22 97.5 °F (36.4 °C)   02/22/22 98.1 °F (36.7 °C)   05/27/21 98.2 °F (36.8 °C)     BP Readings from Last 3 Encounters:   02/23/22 (!) 168/113   02/22/22 (!) 143/69   05/27/21 133/89     Pulse Readings from Last 3 Encounters:   02/23/22 61   02/22/22 (!) 58   05/27/21 64            DATA     LABORATORY DATA:  Labs Reviewed   GLUCOSE, POC - Abnormal; Notable for the following components:       Result Value    Glucose (POC) 439 (*)     All other components within normal limits   GLUCOSE, POC - Abnormal; Notable for the following components:    Glucose (POC) 289 (*)     All other components within normal limits     Admission on 02/22/2022   Component Date Value Ref Range Status    Glucose (POC) 02/22/2022 439* 65 - 117 mg/dL Final    Performed by 02/22/2022 [facility] Omar Cindy   Final    Glucose (POC) 02/23/2022 289* 65 - 117 mg/dL Final    Performed by 02/23/2022 [address]   Final   Admission on 02/21/2022, Discharged on 02/22/2022   Component Date Value Ref Range Status    SAMPLES BEING HELD 02/21/2022 1BLU   Final    COMMENT 02/21/2022 Add-on orders for these samples will be processed based on acceptable specimen integrity and analyte stability, which may vary by analyte.     Final    Sodium 02/21/2022 138  136 - 145 mmol/L Final    Potassium 02/21/2022 3.9  3.5 - 5.1 mmol/L Final    Chloride 02/21/2022 104  97 - 108 mmol/L Final    CO2 02/21/2022 26  21 - 32 mmol/L Final    Anion gap 02/21/2022 8  5 - 15 mmol/L Final    Glucose 02/21/2022 264* 65 - 100 mg/dL Final    BUN 02/21/2022 8  6 - 20 MG/DL Final    Creatinine 02/21/2022 0.78  0.70 - 1.30 MG/DL Final    BUN/Creatinine ratio 02/21/2022 10* 12 - 20   Final    GFR est AA 02/21/2022 >60  >60 ml/min/1.73m2 Final    GFR est non-AA 02/21/2022 >60  >60 ml/min/1.73m2 Final    Calcium 02/21/2022 9.2  8.5 - 10.1 MG/DL Final    Bilirubin, total 02/21/2022 0.3  0.2 - 1.0 MG/DL Final    ALT (SGPT) 02/21/2022 32  12 - 78 U/L Final    AST (SGOT) 02/21/2022 21  15 - 37 U/L Final    Alk. phosphatase 02/21/2022 55  45 - 117 U/L Final    Protein, total 02/21/2022 7.1  6.4 - 8.2 g/dL Final    Albumin 02/21/2022 3.8  3.5 - 5.0 g/dL Final    Globulin 02/21/2022 3.3  2.0 - 4.0 g/dL Final    A-G Ratio 02/21/2022 1.2  1.1 - 2.2   Final    WBC 02/21/2022 3.7* 4.1 - 11.1 K/uL Final    RBC 02/21/2022 4.01* 4.10 - 5.70 M/uL Final    HGB 02/21/2022 12.5  12.1 - 17.0 g/dL Final    HCT 02/21/2022 36.6  36.6 - 50.3 % Final    MCV 02/21/2022 91.3  80.0 - 99.0 FL Final    MCH 02/21/2022 31.2  26.0 - 34.0 PG Final    MCHC 02/21/2022 34.2  30.0 - 36.5 g/dL Final    RDW 02/21/2022 13.2  11.5 - 14.5 % Final    PLATELET 00/47/6785 741  150 - 400 K/uL Final    MPV 02/21/2022 10.1  8.9 - 12.9 FL Final    NRBC 02/21/2022 0.0  0  WBC Final    ABSOLUTE NRBC 02/21/2022 0.00  0.00 - 0.01 K/uL Final    NEUTROPHILS 02/21/2022 58  32 - 75 % Final    LYMPHOCYTES 02/21/2022 30  12 - 49 % Final    MONOCYTES 02/21/2022 9  5 - 13 % Final    EOSINOPHILS 02/21/2022 2  0 - 7 % Final    BASOPHILS 02/21/2022 1  0 - 1 % Final    IMMATURE GRANULOCYTES 02/21/2022 0  0.0 - 0.5 % Final    ABS. NEUTROPHILS 02/21/2022 2.2  1.8 - 8.0 K/UL Final    ABS. LYMPHOCYTES 02/21/2022 1.1  0.8 - 3.5 K/UL Final    ABS. MONOCYTES 02/21/2022 0.3  0.0 - 1.0 K/UL Final    ABS. EOSINOPHILS 02/21/2022 0.1  0.0 - 0.4 K/UL Final    ABS. BASOPHILS 02/21/2022 0.0  0.0 - 0.1 K/UL Final    ABS. IMM.  GRANS. 02/21/2022 0.0  0.00 - 0.04 K/UL Final    DF 02/21/2022 AUTOMATED    Final    Acetaminophen level 02/21/2022 <2* 10 - 30 ug/mL Final    Salicylate level 03/47/4439 <1.7* 2.8 - 20.0 MG/DL Final    ALCOHOL(ETHYL),SERUM 02/21/2022 57* <10 MG/DL Final    AMPHETAMINES 02/21/2022 Negative  NEG   Final    BARBITURATES 02/21/2022 Negative  NEG   Final    BENZODIAZEPINES 02/21/2022 Negative  NEG   Final    COCAINE 02/21/2022 Negative  NEG   Final    METHADONE 02/21/2022 Negative  NEG   Final    OPIATES 02/21/2022 Negative  NEG   Final    PCP(PHENCYCLIDINE) 02/21/2022 Negative  NEG   Final    THC (TH-CANNABINOL) 02/21/2022 Negative  NEG   Final    Drug screen comment 02/21/2022 (NOTE)   Final    SARS-CoV-2 02/21/2022 Not detected  NOTD   Final    Influenza A by PCR 02/21/2022 Not detected  NOTD   Final    Influenza B by PCR 02/21/2022 Not detected  NOTD   Final    Glucose (POC) 02/22/2022 334* 65 - 117 mg/dL Final    Performed by 02/22/2022 Binnie Cranker travel RN   Final    Glucose (POC) 02/22/2022 299* 65 - 117 mg/dL Final    Performed by 02/22/2022 Binnie Cranker travel RN   Final        RADIOLOGY REPORTS:  Results from Hospital Encounter encounter on 09/15/12    XR CHEST PA LAT    Narrative  **Final Report**      ICD Codes / Adm. Diagnosis: 344360  V62.84 / Mental Health Problem  Examination:  CR CHEST PA AND LATERAL  - [id number] - Sep 15 2012  2:44AM  Accession No:  [id number]  Reason:  chest pain      REPORT:  INDICATION: chest pain    EXAM: CXR 2 Views. FINDINGS: Frontal and lateral views of the chest show the lungs are free of  acute disease. Heart size is normal. There is no pulmonary edema. There is  no evident adenopathy or pleural effusion. There is no significant change  since prior study. IMPRESSION: No acute disease. No significant interval change. Signing/Reading Doctor: Leon Shields ([id number])  Approved: Leon Shields ([id number])  09/15/2012  No results found.            MEDICATIONS       ALL MEDICATIONS  Current Facility-Administered Medications   Medication Dose Route Frequency    nicotine (NICODERM CQ) 21 mg/24 hr patch 1 Patch  1 Patch TransDERmal DAILY    aspirin delayed-release tablet 81 mg  81 mg Oral DAILY    atorvastatin (LIPITOR) tablet 20 mg  20 mg Oral QHS    cholecalciferol (VITAMIN D3) (1000 Units /25 mcg) tablet 5,000 Units  5,000 Units Oral DAILY    clopidogreL (PLAVIX) tablet 75 mg  75 mg Oral DAILY    lisinopriL (PRINIVIL, ZESTRIL) tablet 10 mg  10 mg Oral DAILY    metFORMIN (GLUCOPHAGE) tablet 1,000 mg  1,000 mg Oral BID WITH MEALS    pantoprazole (PROTONIX) tablet 40 mg  40 mg Oral ACB    perphenazine (TRILAFON) tablet tab 8 mg  8 mg Oral QHS    sertraline (ZOLOFT) tablet 100 mg  100 mg Oral DAILY    glucose chewable tablet 16 g  4 Tablet Oral PRN    dextrose (D50W) injection syrg 12.5-25 g  25-50 mL IntraVENous PRN    glucagon (GLUCAGEN) injection 1 mg  1 mg IntraMUSCular PRN    insulin glargine (LANTUS) injection 20 Units  20 Units SubCUTAneous DAILY    OLANZapine (ZyPREXA) tablet 2.5 mg  2.5 mg Oral Q6H PRN    haloperidol lactate (HALDOL) injection 2.5 mg  2.5 mg IntraMUSCular Q6H PRN    benztropine (COGENTIN) tablet 0.5 mg  0.5 mg Oral BID PRN    diphenhydrAMINE (BENADRYL) injection 25 mg  25 mg IntraMUSCular BID PRN    acetaminophen (TYLENOL) tablet 650 mg  650 mg Oral Q4H PRN    magnesium hydroxide (MILK OF MAGNESIA) 400 mg/5 mL oral suspension 30 mL  30 mL Oral DAILY PRN      SCHEDULED MEDICATIONS  Current Facility-Administered Medications   Medication Dose Route Frequency    nicotine (NICODERM CQ) 21 mg/24 hr patch 1 Patch  1 Patch TransDERmal DAILY    aspirin delayed-release tablet 81 mg  81 mg Oral DAILY    atorvastatin (LIPITOR) tablet 20 mg  20 mg Oral QHS    cholecalciferol (VITAMIN D3) (1000 Units /25 mcg) tablet 5,000 Units  5,000 Units Oral DAILY    clopidogreL (PLAVIX) tablet 75 mg  75 mg Oral DAILY    lisinopriL (PRINIVIL, ZESTRIL) tablet 10 mg  10 mg Oral DAILY    metFORMIN (GLUCOPHAGE) tablet 1,000 mg  1,000 mg Oral BID WITH MEALS    pantoprazole (PROTONIX) tablet 40 mg  40 mg Oral ACB    perphenazine (TRILAFON) tablet tab 8 mg  8 mg Oral QHS    sertraline (ZOLOFT) tablet 100 mg  100 mg Oral DAILY    insulin glargine (LANTUS) injection 20 Units  20 Units SubCUTAneous DAILY ASSESSMENT & PLAN        The patient, Amparo Hawthorne, is a 78 y.o.  male who presents at this time for treatment of the following diagnoses:  Patient Active Hospital Problem List:   Schizoaffective disorder [facility]) (2022)    Assessment: patient presents with fleeting SI in the setting of family stressors. He is jovial and pleasant on interview but did make the statement that he would harm himself after his wife's . Will adjust antidepressants, work on coping and bereavement resources. Plan:  - RESTART and INCREASE Zoloft to 100 mg QDAY for MDD  - DISCONTINUE Zyprexa due to DM2, polypharmacy  - RESTART Trilafon 8 mg QHS for psychosis, MDD adjunct  - IGM therapy as tolerated  - Expand database / obtain collateral  - Dispo planning (home when stable)           A coordinated, multidisplinary treatment team (includes the nurse, unit pharmacist,  and writer) round was conducted for this initial evaluation with the patient present. The following regarding medications was addressed during rounds with patient: the risks and benefits of the proposed medication. The patient was given the opportunity to ask questions. Informed consent given to the use of the above medications. I will continue to adjust psychiatric and non-psychiatric medications (see above \"medication\" section and orders section for details) as deemed appropriate & based upon diagnoses and response to treatment. I have reviewed admission (and previous/old) labs and medical tests in the EHR and or transferring hospital documents. I will continue to order blood tests/labs and diagnostic tests as deemed appropriate and review results as they become available (see orders for details). I have reviewed old psychiatric and medical records available in the EHR. I Will order additional psychiatric records from other institutions to further elucidate the nature of patient's psychopathology and review once available.     I will gather additional collateral information from friends, family and o/p treatment team to further elucidate the nature of patient's psychopathology and baselline level of psychiatric functioning. I certify that this patient's inpatient psychiatric hospital services are required for treatment that could reasonably be expected to improve the patient's condition, or for diagnostic study, and that the patient continues to need, on a daily basis, active treatment furnished directly by or requiring the supervision of inpatient psychiatric facility personnel. In addition, the hospital records show that services furnished were intensive treatment services, admission or related services, or equivalent services.       ESTIMATED LENGTH OF STAY:  3-5 days       STRENGTHS:  Exercising self-direction/Resourceful, Access to housing/residential stability and Interpersonal/supportive relationships (family, friends, peers)                                        SIGNED:    Alfonzo Edward MD  2/23/2022

## 2022-02-22 NOTE — GROUP NOTE
SOHAIL  GROUP DOCUMENTATION INDIVIDUAL                                                                          Group Therapy Note    Date: 2/22/2022    Group Start Time: 1100  Group End Time: 1200  Group Topic: Topic Group    [facility] 3 ACUTE BEHAV [facility]    [place], [address] [facility] GROUP DOCUMENTATION GROUP    Group Therapy Note    Attendees: 3         Attendance: Attended    Patient's Goal:  To participate in relaxation activity    Interventions/techniques: Supported-game    Interactions: Interacted appropriately    Mental Status: Elevated    Behavior/appearance: Cooperative and Needed prompting    Goals Achieved: Able to engage in interactions and Able to listen to others      Additional Notes:  Hyper verbal at times    Faustino Shaw

## 2022-02-22 NOTE — PROGRESS NOTES
Behavioral Services  Medicare Certification Upon Admission    I certify that this patient's inpatient psychiatric hospital admission is medically necessary for:    [x] (1) Treatment which could reasonably be expected to improve this patient's condition,       [x] (2) Or for diagnostic study;     AND     [x](2) The inpatient psychiatric services are provided while the individual is under the care of a physician and are included in the individualized plan of care.     Estimated length of stay/service 5-7 days    Plan for post-hospital care home    Electronically signed by Tanja Peña MD on 2/22/2022 at 9:25 AM

## 2022-02-23 VITALS
WEIGHT: 233 LBS | SYSTOLIC BLOOD PRESSURE: 168 MMHG | RESPIRATION RATE: 16 BRPM | BODY MASS INDEX: 31.6 KG/M2 | HEART RATE: 61 BPM | OXYGEN SATURATION: 96 % | DIASTOLIC BLOOD PRESSURE: 113 MMHG | TEMPERATURE: 97.5 F

## 2022-02-23 LAB
GLUCOSE BLD STRIP.AUTO-MCNC: 289 MG/DL (ref 65–117)
SERVICE CMNT-IMP: ABNORMAL

## 2022-02-23 PROCEDURE — 74011636637 HC RX REV CODE- 636/637: Performed by: PSYCHIATRY & NEUROLOGY

## 2022-02-23 PROCEDURE — 99239 HOSP IP/OBS DSCHRG MGMT >30: CPT | Performed by: PSYCHIATRY & NEUROLOGY

## 2022-02-23 PROCEDURE — 74011250637 HC RX REV CODE- 250/637: Performed by: PSYCHIATRY & NEUROLOGY

## 2022-02-23 PROCEDURE — 82962 GLUCOSE BLOOD TEST: CPT

## 2022-02-23 RX ORDER — SERTRALINE HYDROCHLORIDE 100 MG/1
100 TABLET, FILM COATED ORAL DAILY
Qty: 30 TABLET | Refills: 1 | Status: SHIPPED | OUTPATIENT
Start: 2022-02-24

## 2022-02-23 RX ADMIN — Medication 20 UNITS: at 08:25

## 2022-02-23 RX ADMIN — Medication 5000 UNITS: at 08:25

## 2022-02-23 RX ADMIN — CLOPIDOGREL BISULFATE 75 MG: 75 TABLET, FILM COATED ORAL at 08:24

## 2022-02-23 RX ADMIN — SERTRALINE 100 MG: 50 TABLET, FILM COATED ORAL at 08:24

## 2022-02-23 RX ADMIN — PANTOPRAZOLE SODIUM 40 MG: 40 TABLET, DELAYED RELEASE ORAL at 06:40

## 2022-02-23 RX ADMIN — ASPIRIN 81 MG: 81 TABLET ORAL at 08:25

## 2022-02-23 RX ADMIN — LISINOPRIL 10 MG: 5 TABLET ORAL at 08:24

## 2022-02-23 RX ADMIN — METFORMIN HYDROCHLORIDE 1000 MG: 500 TABLET, FILM COATED ORAL at 08:24

## 2022-02-23 NOTE — PROGRESS NOTES
Laboratory monitoring for mood stabilizer and antipsychotics:    Recommended baseline monitoring has been completed based on this patient's current medication regimen. The patient is currently taking the following medication(s):   Current Facility-Administered Medications   Medication Dose Route Frequency    nicotine (NICODERM CQ) 21 mg/24 hr patch 1 Patch  1 Patch TransDERmal DAILY    aspirin delayed-release tablet 81 mg  81 mg Oral DAILY    atorvastatin (LIPITOR) tablet 20 mg  20 mg Oral QHS    cholecalciferol (VITAMIN D3) (1000 Units /25 mcg) tablet 5,000 Units  5,000 Units Oral DAILY    clopidogreL (PLAVIX) tablet 75 mg  75 mg Oral DAILY    lisinopriL (PRINIVIL, ZESTRIL) tablet 10 mg  10 mg Oral DAILY    metFORMIN (GLUCOPHAGE) tablet 1,000 mg  1,000 mg Oral BID WITH MEALS    pantoprazole (PROTONIX) tablet 40 mg  40 mg Oral ACB    perphenazine (TRILAFON) tablet tab 8 mg  8 mg Oral QHS    sertraline (ZOLOFT) tablet 100 mg  100 mg Oral DAILY    insulin glargine (LANTUS) injection 20 Units  20 Units SubCUTAneous DAILY       Height, Weight, BMI Estimation  Estimated body mass index is 31.6 kg/m² as calculated from the following:    Height as of 5/23/21: 182.9 cm (72\"). Weight as of this encounter: 105.7 kg (233 lb). Renal Function, Hepatic Function and Chemistry  Estimated Creatinine Clearance: 115.4 mL/min (based on SCr of 0.78 mg/dL). Lab Results   Component Value Date/Time    Sodium 138 02/21/2022 06:25 PM    Potassium 3.9 02/21/2022 06:25 PM    Chloride 104 02/21/2022 06:25 PM    CO2 26 02/21/2022 06:25 PM    Anion gap 8 02/21/2022 06:25 PM    Glucose 264 (H) 02/21/2022 06:25 PM    Glucose 211 (H) 09/06/2012 04:17 AM    BUN 8 02/21/2022 06:25 PM    Creatinine 0.78 02/21/2022 06:25 PM    BUN/Creatinine ratio 10 (L) 02/21/2022 06:25 PM    GFR est AA >60 02/21/2022 06:25 PM    GFR est non-AA >60 02/21/2022 06:25 PM    Calcium 9.2 02/21/2022 06:25 PM    ALT (SGPT) 32 02/21/2022 06:25 PM    Alk. phosphatase 55 02/21/2022 06:25 PM    Protein, total 7.1 02/21/2022 06:25 PM    Albumin 3.8 02/21/2022 06:25 PM    Globulin 3.3 02/21/2022 06:25 PM    A-G Ratio 1.2 02/21/2022 06:25 PM    Bilirubin, total 0.3 02/21/2022 06:25 PM       Lab Results   Component Value Date/Time    Glucose 264 (H) 02/21/2022 06:25 PM    Glucose 211 (H) 09/06/2012 04:17 AM    Glucose (POC) 289 (H) 02/23/2022 07:57 AM       Lab Results   Component Value Date/Time    Hemoglobin A1c 10.3 (H) 05/26/2021 05:25 AM       Hematology  Lab Results   Component Value Date/Time    WBC 3.7 (L) 02/21/2022 06:25 PM    HGB 12.5 02/21/2022 06:25 PM    HCT 36.6 02/21/2022 06:25 PM    PLATELET 533 68/26/9951 06:25 PM    MCV 91.3 02/21/2022 06:25 PM       Lipids  Lab Results   Component Value Date/Time    Cholesterol, total 173 05/26/2021 05:25 AM    HDL Cholesterol 68 05/26/2021 05:25 AM    LDL, calculated 83.8 05/26/2021 05:25 AM    Triglyceride 106 05/26/2021 05:25 AM    CHOL/HDL Ratio 2.5 05/26/2021 05:25 AM       Thyroid Function  Lab Results   Component Value Date/Time    TSH 1.30 05/26/2021 05:25 AM       Vitals  Visit Vitals  BP (!) 168/113   Pulse 61   Temp 97.5 °F (36.4 °C)   Resp 16   Wt 105.7 kg (233 lb)   SpO2 96%   BMI 31.60 kg/m²       Lin Joy, [address] Ann[address] Leyva, BCPS  [phone] (pharmacy)

## 2022-02-23 NOTE — GROUP NOTE
SOHAIL  GROUP DOCUMENTATION INDIVIDUAL                                                                          Group Therapy Note    Date: 2/23/2022    Group Start Time: 1100  Group End Time: 1200  Group Topic: Topic Group    [address] [facility] [address] 3 ACUTE BEHAV [facility], [address] [facility] GROUP DOCUMENTATION GROUP    Group Therapy Note    Attendees: 4         Attendance: Attended    Patient's Goal:  To participate in relaxation activity    Interventions/techniques: Supported-game    Interactions: Interacted appropriately    Mental Status: Calm    Behavior/appearance: Needed prompting    Goals Achieved:        Additional Notes:  Pt declined active participation-left session early    Christina Fernandez

## 2022-02-23 NOTE — DISCHARGE SUMMARY
PSYCHIATRIC DISCHARGE SUMMARY         IDENTIFICATION:    Patient Name  Merly Fernandez   Date of Birth 1955   [facility] [id number]   Medical Record Number  [id number]      Age  78 y.o. PCP Skiff, Linda Oh, NP   Admit date:  2/22/2022    Discharge date: 2/23/2022   Room Number  320/02  @ [facility]   Date of Service  2/23/2022            TYPE OF DISCHARGE: REGULAR               CONDITION AT DISCHARGE: improved and fair       PROVISIONAL & DISCHARGE DIAGNOSES:    Problem List  Date Reviewed: 8/8/2017          Codes Class    * (Principal) Schizoaffective disorder ([facility] ICD-10-CM: F25.9  ICD-9-CM: 295.70         Alcohol use disorder, moderate, dependence ([facility] ICD-10-CM: F10.20  ICD-9-CM: 303.90         Adjustment disorder with depressed mood ICD-10-CM: F43.21  ICD-9-CM: 309.0         Needs smoking cessation education ICD-10-CM: F17.200  ICD-9-CM: V62.3         History of stroke ICD-10-CM: Z86.73  ICD-9-CM: V12.54         Schizoaffective disorder, depressive type ([facility].) ICD-10-CM: F25.1  ICD-9-CM: 295.70         Insulin dependent type 2 diabetes mellitus, uncontrolled ([facility] ICD-10-CM: E11.65, Z79.4  ICD-9-CM: 250.02, V58.67         Essential hypertension ICD-10-CM: I10  ICD-9-CM: 401.9         Sleep apnea ICD-10-CM: G47.30  ICD-9-CM: 780.57               Active Hospital Problems    *Schizoaffective disorder ([facility]        DISCHARGE DIAGNOSIS:   Axis I:  SEE ABOVE  Axis II: SEE ABOVE  Axis III: SEE ABOVE  Axis IV:  lack of structure  Axis V:  30 on admission, 70 on discharge     CC & HISTORY OF PRESENT ILLNESS:  \"suicidal ideation\"    The patient, Merly Fernandez, is a 78 y.o. WHITE/NON- male with a past psychiatric history significant for schizoaffective disorder, depressed mood, who presents at this time with complaints of (and/or evidence of) the following emotional symptoms: depression and suicidal thoughts/threats. Additional symptomatology include poor self care.   The above symptoms have been present for 3 days. These symptoms are of moderate to high severity. These symptoms are ntermittent/ fleeting in nature. The patient's condition has been precipitated by psychosocial stressors (the death of his wife a few days prior to admission). UDS: negative; BAL=0.     The patient is well-known to the unit, and presented approx 6 months earlier in crisis without any specific trigger. He was discharged home with outpatient follow up and had been doing well until his wife passed away. He made comments that he would want to get his wife's affairs in order and then take his own life after her .      The patient is a fair historian. The patient corroborates the above narrative. The patient contracts for safety on the unit and gives consent for the team to contact collateral. The patient is amenable to initiating treatment while on the unit. He agrees to an increase in his antidepressant given ongoing dysphoria since his wife . SOCIAL HISTORY:    Social History     Socioeconomic History    Marital status:      Spouse name: Not on file    Number of children: Not on file    Years of education: Not on file    Highest education level: Not on file   Occupational History    Not on file   Tobacco Use    Smoking status: Current Every Day Smoker     Packs/day: 1.00    Smokeless tobacco: Never Used   Substance and Sexual Activity    Alcohol use: Yes     Comment: drinks a fifth of wehiskey and a case a beer per day    Drug use: No    Sexual activity: Not on file   Other Topics Concern    Not on file   Social History Narrative    Not on file     Social Determinants of Health     Financial Resource Strain:     Difficulty of Paying Living Expenses: Not on file   Food Insecurity:     Worried About Running Out of Food in the Last Year: Not on file    Dulce Maria of Food in the Last Year: Not on file   Transportation Needs:     Lack of Transportation (Medical):  Not on file    Lack of Transportation (Non-Medical): Not on file   Physical Activity:     Days of Exercise per Week: Not on file    Minutes of Exercise per Session: Not on file   Stress:     Feeling of Stress : Not on file   Social Connections:     Frequency of Communication with Friends and Family: Not on file    Frequency of Social Gatherings with Friends and Family: Not on file    Attends [employer] Services: Not on file    Active Member of [address] [place] [address] or Organizations: Not on file    Attends Club or Organization Meetings: Not on file    Marital Status: Not on file   Intimate Partner Violence:     Fear of Current or Ex-Partner: Not on file    Emotionally Abused: Not on file    Physically Abused: Not on file    Sexually Abused: Not on file   Housing Stability:     Unable to Pay for Housing in the Last Year: Not on file    Number of [place] in the Last Year: Not on file    Unstable Housing in the Last Year: Not on file      FAMILY HISTORY:   No family history on file. HOSPITALIZATION COURSE:    Prateek Galdamez was admitted to the inpatient psychiatric unit [facility] for acute psychiatric stabilization in regards to symptomatology as described in the HPI above. The differential diagnosis at time of admission included: MDD vs adjustment disorder. While on the unit Prateek Galdamez was involved in individual, group, occupational and milieu therapy. Psychiatric medications were adjusted during this hospitalization including Zoloft. Prateek Galdamez demonstrated a slow, but progressive improvement in overall condition. Much of patient's initial presentation appeared to be related to situational stressors and psychological factors. Please see individual progress notes for more specific details regarding patient's hospitalization course. Patient with request for discharge today. There are no grounds to seek a TDO.  Patient made future oriented statements prior to discharge, including his desire to move from his current property as it reminds him of his wife, and a plan to start with survivor's groups and more AA. Patient encouraged to utilize resources and speak with his wife's family is he is struggling. He voiced understanding. At time of discharge, Vincenzo Cotton is without significant problems of depression, psychosis, or macho. Patient free of suicidal and homicidal ideations (appears to be at very low risk of suicide or homicide) and reports many positive predictive factors in terms of not attempting suicide or homicide. Overall presentation at time of discharge is most consistent with the diagnosis of schizoaffective disorder, depressed type. Patient has maximized benefit to be derived from acute inpatient psychiatric treatment. All members of the treatment team concur with each other in regards to plans for discharge today. Patient aware and in agreement with discharge and discharge plan.          LABS AND IMAGAING:    Labs Reviewed   GLUCOSE, POC - Abnormal; Notable for the following components:       Result Value    Glucose (POC) 439 (*)     All other components within normal limits   GLUCOSE, POC - Abnormal; Notable for the following components:    Glucose (POC) 289 (*)     All other components within normal limits     No results found for: DS35, PHEN, PHENO, PHENT, DILF, DS39, PHENY, PTN, VALF2, VALAC, VALP, VALPR, DS6, CRBAM, CRBAMP, CARB2, XCRBAM  Admission on 02/22/2022, Discharged on 02/23/2022   Component Date Value Ref Range Status    Glucose (POC) 02/22/2022 439* 65 - 117 mg/dL Final    Performed by 02/22/2022 [facility] Omar Cindy   Final    Glucose (POC) 02/23/2022 289* 65 - 117 mg/dL Final    Performed by 02/23/2022 [address]   Final   Admission on 02/21/2022, Discharged on 02/22/2022   Component Date Value Ref Range Status    SAMPLES BEING HELD 02/21/2022 1BLU   Final    COMMENT 02/21/2022 Add-on orders for these samples will be processed based on acceptable specimen integrity and analyte stability, which may vary by analyte. Final    Sodium 02/21/2022 138  136 - 145 mmol/L Final    Potassium 02/21/2022 3.9  3.5 - 5.1 mmol/L Final    Chloride 02/21/2022 104  97 - 108 mmol/L Final    CO2 02/21/2022 26  21 - 32 mmol/L Final    Anion gap 02/21/2022 8  5 - 15 mmol/L Final    Glucose 02/21/2022 264* 65 - 100 mg/dL Final    BUN 02/21/2022 8  6 - 20 MG/DL Final    Creatinine 02/21/2022 0.78  0.70 - 1.30 MG/DL Final    BUN/Creatinine ratio 02/21/2022 10* 12 - 20   Final    GFR est AA 02/21/2022 >60  >60 ml/min/1.73m2 Final    GFR est non-AA 02/21/2022 >60  >60 ml/min/1.73m2 Final    Calcium 02/21/2022 9.2  8.5 - 10.1 MG/DL Final    Bilirubin, total 02/21/2022 0.3  0.2 - 1.0 MG/DL Final    ALT (SGPT) 02/21/2022 32  12 - 78 U/L Final    AST (SGOT) 02/21/2022 21  15 - 37 U/L Final    Alk. phosphatase 02/21/2022 55  45 - 117 U/L Final    Protein, total 02/21/2022 7.1  6.4 - 8.2 g/dL Final    Albumin 02/21/2022 3.8  3.5 - 5.0 g/dL Final    Globulin 02/21/2022 3.3  2.0 - 4.0 g/dL Final    A-G Ratio 02/21/2022 1.2  1.1 - 2.2   Final    WBC 02/21/2022 3.7* 4.1 - 11.1 K/uL Final    RBC 02/21/2022 4.01* 4.10 - 5.70 M/uL Final    HGB 02/21/2022 12.5  12.1 - 17.0 g/dL Final    HCT 02/21/2022 36.6  36.6 - 50.3 % Final    MCV 02/21/2022 91.3  80.0 - 99.0 FL Final    MCH 02/21/2022 31.2  26.0 - 34.0 PG Final    MCHC 02/21/2022 34.2  30.0 - 36.5 g/dL Final    RDW 02/21/2022 13.2  11.5 - 14.5 % Final    PLATELET 14/52/5404 205  150 - 400 K/uL Final    MPV 02/21/2022 10.1  8.9 - 12.9 FL Final    NRBC 02/21/2022 0.0  0  WBC Final    ABSOLUTE NRBC 02/21/2022 0.00  0.00 - 0.01 K/uL Final    NEUTROPHILS 02/21/2022 58  32 - 75 % Final    LYMPHOCYTES 02/21/2022 30  12 - 49 % Final    MONOCYTES 02/21/2022 9  5 - 13 % Final    EOSINOPHILS 02/21/2022 2  0 - 7 % Final    BASOPHILS 02/21/2022 1  0 - 1 % Final    IMMATURE GRANULOCYTES 02/21/2022 0  0.0 - 0.5 % Final    ABS.  NEUTROPHILS 02/21/2022 2.2  1.8 - 8.0 K/UL Final    ABS. LYMPHOCYTES 02/21/2022 1.1  0.8 - 3.5 K/UL Final    ABS. MONOCYTES 02/21/2022 0.3  0.0 - 1.0 K/UL Final    ABS. EOSINOPHILS 02/21/2022 0.1  0.0 - 0.4 K/UL Final    ABS. BASOPHILS 02/21/2022 0.0  0.0 - 0.1 K/UL Final    ABS. IMM. GRANS. 02/21/2022 0.0  0.00 - 0.04 K/UL Final    DF 02/21/2022 AUTOMATED    Final    Acetaminophen level 02/21/2022 <2* 10 - 30 ug/mL Final    Salicylate level 85/29/8151 <1.7* 2.8 - 20.0 MG/DL Final    ALCOHOL(ETHYL),SERUM 02/21/2022 57* <10 MG/DL Final    AMPHETAMINES 02/21/2022 Negative  NEG   Final    BARBITURATES 02/21/2022 Negative  NEG   Final    BENZODIAZEPINES 02/21/2022 Negative  NEG   Final    COCAINE 02/21/2022 Negative  NEG   Final    METHADONE 02/21/2022 Negative  NEG   Final    OPIATES 02/21/2022 Negative  NEG   Final    PCP(PHENCYCLIDINE) 02/21/2022 Negative  NEG   Final    THC (TH-CANNABINOL) 02/21/2022 Negative  NEG   Final    Drug screen comment 02/21/2022 (NOTE)   Final    SARS-CoV-2 02/21/2022 Not detected  NOTD   Final    Influenza A by PCR 02/21/2022 Not detected  NOTD   Final    Influenza B by PCR 02/21/2022 Not detected  NOTD   Final    Glucose (POC) 02/22/2022 334* 65 - 117 mg/dL Final    Performed by 02/22/2022 Rachna ocampo RN   Final    Glucose (POC) 02/22/2022 299* 65 - 117 mg/dL Final    Performed by 02/22/2022 Rachna ocampo RN   Final     No results found. DISPOSITION:    Home. Patient to f/u with psychiatric and psychotherapy appointments. Patient is to resume AA and attend bereavement groups as necessary. FOLLOW-UP CARE:    Activity as tolerated  Regular diet  Wound Care: none needed. Follow-up Information     Follow up With Specialties Details [facility] [address] at [facility]: Outpatient Psychiatry  Call today Please contact [phone] to schedule an intake appointment to restart services.   Address: [address], [address]  Phone: [phone]    [address] [facility]   Call in 1 day Please call Rapid Access phone line [phone] between 8:00AM -2:00PM to complete intake assessment for ongoing mental health case management, therapy and medication management Address: [facility], [address]  Phone: [phone]  Fax: [phone]  Crisis number:  [phone]     Support for  Spouses    Nine-week bereavement program held evenings  two times per year. Spring Group starts in March  in [facility]. For more information, contact  Roseline Wilcox at [phone] or [email or web address]. Alcoholics Anonymous Meeting List    In your discharge paperwork we have provided a local meetings list for the [address] [place] [address].  Support Group    Six-week support group in [facility] for those  who have faced the death of a spouse. For more  information and to register, call Lili Albarado [address] [place] [address] for widows and widowers with  monthly events at [employer]. Call for dates or check calendar online. [phone];  [email or web address]. PROGNOSIS:   Fair ---- based on nature of patient's pathology/ies and treatment compliance issues. Prognosis is greatly dependent upon patient's ability to remain sober and to follow up with scheduled appointments as well as to comply with psychiatric medications as prescribed. DISCHARGE MEDICATIONS:     Informed consent given for the use of following psychotropic medications:  Discharge Medication List as of 2/23/2022  1:06 PM      CONTINUE these medications which have CHANGED    Details   sertraline (ZOLOFT) 100 mg tablet Take 1 Tablet by mouth daily.  Indications: mood, Normal, Disp-30 Tablet, R-1         CONTINUE these medications which have NOT CHANGED    Details   atorvastatin (LIPITOR) 20 mg tablet Take 20 mg by mouth nightly., Historical Med      perphenazine (TRILAFON) 8 mg tablet Take 8 mg by mouth nightly., Historical Med      lisinopriL (PRINIVIL, ZESTRIL) 10 mg tablet Take 10 mg by mouth daily. , Historical Med      hydrOXYzine HCL (ATARAX) 50 mg tablet Take 50 mg by mouth every four (4) hours as needed for Anxiety. , Historical Med      metFORMIN (GLUCOPHAGE) 1,000 mg tablet Take 1,000 mg by mouth two (2) times daily (with meals). , Historical Med      insulin NPH/insulin regular (NovoLIN 70/30 U-100 Insulin) 100 unit/mL (70-30) injection 18 Units by SubCUTAneous route Before breakfast and dinner. Indications: type 2 diabetes mellitus, Historical Med      clopidogreL (PLAVIX) 75 mg tab Take 1 Tablet by mouth daily. Indications: prevention for a blood clot going to the brain, Normal, Disp-30 Tablet, R-1      traZODone (DESYREL) 100 mg tablet Take 1 Tablet by mouth nightly. Indications: insomnia, Normal, Disp-30 Tablet, R-1      aspirin delayed-release 81 mg tablet Take 1 Tablet by mouth daily. Indications: prevention of transient ischemic attack, Normal, Disp-30 Tablet, R-1      cholecalciferol (VITAMIN D3) (5000 Units/125 mcg) tab tablet Take 1 Tablet by mouth daily. Indications: prevention of vitamin D deficiency, Normal, Disp-30 Tablet, R-1      pantoprazole (PROTONIX) 40 mg tablet Take 1 Tablet by mouth daily. Indications: gastroesophageal reflux disease, Normal, Disp-30 Tablet, R-1         STOP taking these medications       OLANZapine (ZyPREXA) 2.5 mg tablet Comments:   Reason for Stopping:                      A coordinated, multidisplinary treatment team round was conducted with Ashley Hernandez---this is done daily here at [facility]. This team consists of the nurse, psychiatric unit pharmacist,  and Kate Mitchell. I have spent greater than 35 minutes on discharge work.     Signed:  Xu Hendrix MD  2/23/2022

## 2022-02-23 NOTE — PROGRESS NOTES
Problem: Risk of Harm to Self or Others  Goal: *LTG: Denial of suicidal ideation or intent for at least 2 days  Outcome: Progressing Towards Goal  Goal: *LTG: Denial of assualtive or homicidal ideation or intent for at least 2 days  Outcome: Progressing Towards Goal  Goal: *LTG: Expression of positive future orientation that includes meaningful activity  Outcome: Progressing Towards Goal  Goal: *LTG: Demonstrate ability to manage frustration or anger  Description: Demonstrate ability to manage frustration or anger without aggressive behavior for 3 consecutive days in therapeutic milieu. Outcome: Progressing Towards Goal  Goal: *STG: Patient will express feelings of depression, suicide, or self-harm without acting out  Description: Patient will express feelings of depression, suicide, or self-harm without acting out. Comment on how this will be achieved. Outcome: Progressing Towards Goal     Problem: Falls - Risk of  Goal: *Absence of Falls  Description: Document Segundo Abreu Fall Risk and appropriate interventions in the flowsheet. Outcome: Progressing Towards Goal  Note: Fall Risk Interventions:            Medication Interventions: Teach patient to arise slowly       5030-4763: Assumed care of patient after receiving shift report from outgoing nurse. Patient was out and visible on unit, interacting appropriately with peers and staff. Affect is labile, mood is depressed. Pt cooperative with vitals and assessment. A&O x 4. Independent in ADLs. Insight and concentration present. Gait is steady. Appetite patterns WNL. Denies AVH/SI/HI. Pt denies pain. Patient medication and diet compliant. Pt encouraged to continue to participate in care. 4870-9829: Patient resting in bed at this time. Patient received PRN zyprexa for restlessness/racing thoughts at 2344.     5126-3933: Pt has been observed throughout the night. Total hours slept approximately 7. No s/s of distress noted. Patient has remained safe.             The documentation for this period is being entered following the guidelines as defined in the [facility] policy by Sharlee Kawasaki, RN.

## 2022-02-23 NOTE — PROGRESS NOTES
Problem: Depressed Mood (Adult/Pediatric)  Goal: *STG: Participates in treatment plan  Outcome: Progressing Towards Goal  Goal: *STG: Verbalizes anger, guilt, and other feelings in a constructive manor  Outcome: Progressing Towards Goal  Goal: *STG: Attends activities and groups  Outcome: Progressing Towards Goal  Goal: *STG: Remains safe in hospital  Outcome: Progressing Towards Goal     0700: Shift change report given to Cindy DUQUE (oncoming nurse) by Gregorio Craven (offgoing nurse). Report included the following information SBAR, Kardex, MAR and Recent Results. 8969-2052: Assumed care of patient. Met patient in dayroom. Calm and cooperative during assessment. Patient denied anxiety, depression, SI, HI, AVH and pain. Medication and meal compliant. 1566-2118: Patient in hallway walking. Meal compliant. 3485-7096: Discharge instructions reviewed with patient.

## 2022-02-23 NOTE — DISCHARGE INSTRUCTIONS
If I feel I am at risk of hurting myself or others, I will call the crisis office and speak with a crisis worker who will assist me during my crisis. [address] [facility]  [phone]  [address] [address] [phone]  701  [facility]-  [address] [place]  [phone]      Patient Education        Learning About Schizoaffective Disorder  What is schizoaffective disorder? Schizoaffective (say \"nery-uc-ne-FECK-tiv\") disorder is a complex mental illness. People who have it have the symptoms of both schizophrenia and a mood disorder. The disorder affects how clearly you can think. It can also make it hard to manage your emotions and connect with others. And it affects how happy or sad you feel. What causes it? Experts don't know what causes schizoaffective disorder. It may have different causes for different people. It's not caused by anything you did or how your parents raised you. And it's not a sign of weakness. What are the symptoms? The symptoms of schizoaffective disorder are the same as those of schizophrenia and a mood disorder. People with schizoaffective disorder may have many of these symptoms. Schizophrenia symptoms include:  · Having hallucinations. This means that you see or hear things that aren't really there. · Having delusions. These are beliefs that aren't real.  · Having a hard time feeling and showing emotion. Mood disorder symptoms include:  · Depression. · Feeling extremely happy or having lots of energy. How is it diagnosed? Your doctor or mental health professional usually can tell if you have schizoaffective disorder by talking with you. He or she will look at the order and timing of your symptoms and how long your symptoms last.  Your doctor will ask you about other things too.  These may include questions about:  · Any odd experiences you may have had, such as hearing voices or having confusing thoughts. · Your feelings. · Any changes in eating habits, energy level, and interest in daily tasks. · How well you are sleeping. · If you can focus on the things you do. How is it treated? Finding out that you have schizoaffective disorder can be scary and hard to deal with. But the disorder can be treated. The goal of treatment is to lower your stress and help your brain work as it should. Ongoing treatment can keep the disorder under control. Treatment includes medicines and counseling. Medicines help your symptoms. It's important to take your medicines on schedule to keep your moods even. When you feel good, you may think that you don't need them. But it is important to keep taking them. Counseling helps you change how you think about things. It can also help you cope with the illness. You will work with a mental health professional. This may be a psychologist, a licensed professional counselor, a clinical , or a psychiatrist.  Follow-up care is a key part of your treatment and safety. Be sure to make and go to all appointments, and call your doctor if you are having problems. It's also a good idea to know your test results and keep a list of the medicines you take. Where can you learn more? Go to http://www.gray.com/  Enter A016 in the search box to learn more about \"Learning About Schizoaffective Disorder. \"  Current as of: June 16, 2021               Content Version: 13.0  © 8354-6344 Health[employer], [employer]. Care instructions adapted under license by [employer] (which disclaims liability or warranty for this information). If you have questions about a medical condition or this instruction, always ask your healthcare professional. [place] [address] any warranty or liability for your use of this information.

## 2022-02-23 NOTE — PROGRESS NOTES
Spiritual Care Assessment/Progress Note  [facility]      NAME: Morgan Catalan      MRN: [id number]  AGE: 78 y.o.  SEX: male  Jehovah's witness Affiliation: Congregation   Language: English     2/23/2022     Total Time (in minutes): 10     Spiritual Assessment begun in [place] [id number] [id number] through conversation with:         [x]Patient        [] Family    [] Friend(s)        Reason for Consult: Initial/Spiritual assessment, patient floor     Spiritual beliefs: (Please include comment if needed)     [x] Identifies with a ting tradition:  Congregation       [] Supported by a ting community:            [] Claims no spiritual orientation:           [] Seeking spiritual identity:                [] Adheres to an individual form of spirituality:           [] Not able to assess:                           Identified resources for coping:      [x] Prayer                               [] Music                  [] Guided Imagery     [] Family/friends                 [] Pet visits     [] Devotional reading                         [] Unknown     [] Other:                                              Interventions offered during this visit: (See comments for more details)    Patient Interventions: Affirmation of emotions/emotional suffering,Affirmation of ting,Catharsis/review of pertinent events in supportive environment,Coping skills reviewed/reinforced,Iconic (affirming the presence of God/Higher Power)           Plan of Care:     [] Support spiritual and/or cultural needs    [] Support AMD and/or advance care planning process      [] Support grieving process   [] Coordinate Rites and/or Rituals    [] Coordination with community clergy   [] No spiritual needs identified at this time   [] Detailed Plan of Care below (See Comments)  [] Make referral to Music Therapy  [] Make referral to Pet Therapy     [] Make referral to Addiction services  [] Make referral to [facility]  [] Make referral to Spiritual Care Partner  [] No future visits requested        [x] Contact Spiritual Care for further referrals     Comments:  visit for initial spiritual assessment. Met patient in [facility], provided spiritual presence and listening. Patient states he goes by the name \"Humberto. \"  Fidelia Lake his wife  at [facility] this past  () as the result of heart problems. Described how he had decided to place her under Hospice and comfort care on  and how she had passed quietly shortly after. Says he is grateful she was not in pain. Spoke about their six years together ( for five years). Described the grief he is feeling but says he knows he will get past this, but will always miss her. Hopes to be discharged later today so that he can take care of her  arrangements. Says he has no other family but has multiple close friends for support. Fidelia Lake his wife has a large family, but says he is not close to them. Spoke of his ting and how his ting is central to his life. Says it is because of his relationship with God that he knows he is going to be okay. Provided safe environment for him to voice his feelings and emotions. He stated he was not in need of anything at this time. He appeared comforted and encouraged as a result of this visit and expressed gratitude for this visit. Informed him of availability of  and pastoral care. Rev.  Janie Pyle MDiv, [facility], [facility]   paging service: [phone])

## 2022-02-23 NOTE — GROUP NOTE
SOH[facility]  GROUP DOCUMENTATION INDIVIDUAL                                                                          Group Therapy Note    Date: 2/23/2022    Group Start Time: 0900  Group End Time: 1000  Group Topic: Topic Group    [facility] 3 ACUTE BEHAV [facility]    [place], [address] [facility] GROUP DOCUMENTATION GROUP    Group Therapy Note    Attendees: 4         Attendance: Attended    Patient's Goal:  To participate in stress management [employer] game    Interventions/techniques: Supported-things pertaining to stress    Follows Directions:  Followed directions    Interactions: Interacted appropriately    Mental Status: Calm    Behavior/appearance: Attentive and Cooperative    Goals Achieved: Able to engage in interactions, Able to listen to others and Discussed coping    Arelis Pedersen Strong peripheral pulses

## 2022-02-23 NOTE — BH NOTES
Patient alert and verbal. Discharged home to continue recommended plan of care. Discharge instructions reviewed with patient. Patient verbalized understanding. Patient belongings and valuables returned. Patient transported via [employer].

## 2022-02-24 NOTE — BH NOTES
Behavioral Health Transition Record to Provider    Patient Name: Melba Bran  YOB: 1955  Medical Record Number: [id number]  Date of Admission: 2/22/2022  Date of Discharge: 2/23/2022    Attending Provider: Kiarra Fregoso MD  Discharging Provider: Kiarra Fregoso MD    To contact this individual call [phone] and ask the  to page. If unavailable, ask to be transferred to [address] [place] [address] Provider on call. [facility] Provider will be available on call 24/7 and during holidays. Primary Care Provider: Quan Hutson NP    Allergies   Allergen Reactions    Egg Swelling       Reason for Admission: SI    Admission Diagnosis: Schizoaffective disorder ([facility] [address] [F25.9]    * No surgery found *    Results for orders placed or performed during the hospital encounter of 02/22/22   GLUCOSE, POC   Result Value Ref Range    Glucose (POC) 439 (H) 65 - 117 mg/dL    Performed by Leelee White    GLUCOSE, POC   Result Value Ref Range    Glucose (POC) 289 (H) 65 - 117 mg/dL    Performed by Alicia Ralph        Immunizations administered during this encounter: There is no immunization history on file for this patient. Screening for Metabolic Disorders for Patients on Antipsychotic Medications  (Data obtained from the EMR)    Estimated Body Mass Index  Estimated body mass index is 31.6 kg/m² as calculated from the following:    Height as of 5/23/21: 6' (1.829 m). Weight as of this encounter: 105.7 kg (233 lb).      Vital Signs/Blood Pressure  Visit Vitals  BP (!) 168/113   Pulse 61   Temp 97.5 °F (36.4 °C)   Resp 16   Wt 105.7 kg (233 lb)   SpO2 96%   BMI 31.60 kg/m²       Blood Glucose/Hemoglobin A1c  Lab Results   Component Value Date/Time    Glucose 264 (H) 02/21/2022 06:25 PM    Glucose 211 (H) 09/06/2012 04:17 AM    Glucose (POC) 289 (H) 02/23/2022 07:57 AM       Lab Results   Component Value Date/Time    Hemoglobin A1c 10.3 (H) 05/26/2021 05:25 AM        Lipid Panel  Lab Results   Component Value Date/Time    Cholesterol, total 173 05/26/2021 05:25 AM    HDL Cholesterol 68 05/26/2021 05:25 AM    LDL, calculated 83.8 05/26/2021 05:25 AM    Triglyceride 106 05/26/2021 05:25 AM    CHOL/HDL Ratio 2.5 05/26/2021 05:25 AM        Discharge Diagnosis: Please refer to physician's discharge summary. Discharge Plan:  The patient Evaristo Gramajo exhibits the ability to control behavior in a less restrictive environment. Patient's level of functioning is improving. No assaultive/destructive behavior has been observed for the past 24 hours. No suicidal/homicidal threat or behavior has been observed for the past 24 hours. There is no evidence of serious medication side effects. Patient has not been in physical or protective restraints for at least the past 24 hours. Discharge Medication List and Instructions:   Discharge Medication List as of 2/23/2022  1:06 PM      CONTINUE these medications which have CHANGED    Details   sertraline (ZOLOFT) 100 mg tablet Take 1 Tablet by mouth daily. Indications: mood, Normal, Disp-30 Tablet, R-1         CONTINUE these medications which have NOT CHANGED    Details   atorvastatin (LIPITOR) 20 mg tablet Take 20 mg by mouth nightly., Historical Med      perphenazine (TRILAFON) 8 mg tablet Take 8 mg by mouth nightly., Historical Med      lisinopriL (PRINIVIL, ZESTRIL) 10 mg tablet Take 10 mg by mouth daily. , Historical Med      hydrOXYzine HCL (ATARAX) 50 mg tablet Take 50 mg by mouth every four (4) hours as needed for Anxiety. , Historical Med      metFORMIN (GLUCOPHAGE) 1,000 mg tablet Take 1,000 mg by mouth two (2) times daily (with meals). , Historical Med      insulin NPH/insulin regular (NovoLIN 70/30 U-100 Insulin) 100 unit/mL (70-30) injection 18 Units by SubCUTAneous route Before breakfast and dinner. Indications: type 2 diabetes mellitus, Historical Med      clopidogreL (PLAVIX) 75 mg tab Take 1 Tablet by mouth daily.  Indications: prevention for a blood clot going to the brain, Normal, Disp-30 Tablet, R-1      traZODone (DESYREL) 100 mg tablet Take 1 Tablet by mouth nightly. Indications: insomnia, Normal, Disp-30 Tablet, R-1      aspirin delayed-release 81 mg tablet Take 1 Tablet by mouth daily. Indications: prevention of transient ischemic attack, Normal, Disp-30 Tablet, R-1      cholecalciferol (VITAMIN D3) (5000 Units/125 mcg) tab tablet Take 1 Tablet by mouth daily. Indications: prevention of vitamin D deficiency, Normal, Disp-30 Tablet, R-1      pantoprazole (PROTONIX) 40 mg tablet Take 1 Tablet by mouth daily. Indications: gastroesophageal reflux disease, Normal, Disp-30 Tablet, R-1         STOP taking these medications       OLANZapine (ZyPREXA) 2.5 mg tablet Comments:   Reason for Stopping:               Unresulted Labs (24h ago, onward)            None        To obtain results of studies pending at discharge, please contact N/A. Follow-up Information     Follow up With Specialties Details Why [address] at [facility]: Outpatient Psychiatry  Call today Please contact [phone] to schedule an intake appointment to restart services. Address: [address] [place] [address]  Phone: [phone]    [address] [place] [address]   Call in 1 day Please call Rapid Access phone line [phone] between 8:00AM -2:00PM to complete intake assessment for ongoing mental health case management, therapy and medication management Address: [address]  Phone: [phone]  Fax: [phone]  Crisis number:  [phone]     Support for  Spouses    Nine-week bereavement program held evenings  two times per year. Spring Group starts in March  in [facility]. For more information, contact  Roseline Wilcox at [phone] or [email or web address]. Alcoholics Anonymous Meeting List    In your discharge paperwork we have provided a local meetings list for the [address] [place] [address].  Support Group    Six-week support group in [facility] for those  who have faced the death of a spouse. For more  information and to register, call Desirae Richardson [facility] group for widows and widowers with  monthly events at [employer]. Call for dates or check calendar online. [phone];  [email or web address]. Advanced Directive:   Does the patient have an appointed surrogate decision maker? Unknown   Does the patient have a Medical Advance Directive? Unknown   Does the patient have a Psychiatric Advance Directive? Unknown   If the patient does not have a surrogate or Medical Advance Directive AND Psychiatric Advance Directive, the patient was offered information on these advance directives. Unknown     Patient Instructions: Please continue all medications until otherwise directed by physician. Tobacco Cessation Discharge Plan:   Is the patient a smoker and needs referral for smoking cessation? No  Patient referred to the following for smoking cessation with an appointment? No   Patient was offered medication to assist with smoking cessation at discharge? No  Was education for smoking cessation added to the discharge instructions? No     Alcohol/Substance Abuse Discharge Plan:   Does the patient have a history of substance/alcohol abuse and requires a referral for treatment? Yes  Patient referred to the following for substance/alcohol abuse treatment with an appointment? Yes  Patient was offered medication to assist with alcohol cessation at discharge? No  Was education for substance/alcohol abuse added to discharge instructions? Yes     Patient discharged to Home; provided to the patient/caregiver either in hard copy or electronically. Continuing care paperwork was faxed to community mental health providers.

## 2022-03-18 PROBLEM — Z86.73 HISTORY OF STROKE: Status: ACTIVE | Noted: 2017-08-08

## 2022-03-18 PROBLEM — F25.9 SCHIZOAFFECTIVE DISORDER (HCC): Status: ACTIVE | Noted: 2022-02-22

## 2022-03-18 PROBLEM — F17.200 NEEDS SMOKING CESSATION EDUCATION: Status: ACTIVE | Noted: 2017-08-08

## 2022-03-18 PROBLEM — F43.21 ADJUSTMENT DISORDER WITH DEPRESSED MOOD: Status: ACTIVE | Noted: 2021-05-25

## 2022-03-18 PROBLEM — F10.20 ALCOHOL USE DISORDER, MODERATE, DEPENDENCE (HCC): Status: ACTIVE | Noted: 2021-05-25

## 2022-03-18 PROBLEM — F25.1 SCHIZOAFFECTIVE DISORDER, DEPRESSIVE TYPE (HCC): Status: ACTIVE | Noted: 2017-08-08

## 2022-03-18 PROBLEM — I10 ESSENTIAL HYPERTENSION: Status: ACTIVE | Noted: 2017-08-08

## 2022-03-19 PROBLEM — G47.30 SLEEP APNEA: Status: ACTIVE | Noted: 2017-08-08

## 2023-05-11 RX ORDER — HYDROXYZINE 50 MG/1
TABLET, FILM COATED ORAL EVERY 4 HOURS PRN
COMMUNITY

## 2023-05-11 RX ORDER — ATORVASTATIN CALCIUM 20 MG/1
20 TABLET, FILM COATED ORAL NIGHTLY
COMMUNITY

## 2023-05-11 RX ORDER — PANTOPRAZOLE SODIUM 40 MG/1
TABLET, DELAYED RELEASE ORAL DAILY
COMMUNITY
Start: 2021-05-28

## 2023-05-11 RX ORDER — ASPIRIN 81 MG/1
TABLET ORAL DAILY
COMMUNITY
Start: 2021-05-28

## 2023-05-11 RX ORDER — PERPHENAZINE 8 MG/1
TABLET ORAL
COMMUNITY

## 2023-05-11 RX ORDER — TRAZODONE HYDROCHLORIDE 100 MG/1
TABLET ORAL
COMMUNITY
Start: 2021-05-28

## 2023-05-11 RX ORDER — SERTRALINE HYDROCHLORIDE 100 MG/1
TABLET, FILM COATED ORAL DAILY
COMMUNITY
Start: 2022-02-24

## 2023-05-11 RX ORDER — CLOPIDOGREL BISULFATE 75 MG/1
TABLET ORAL DAILY
COMMUNITY
Start: 2021-05-28

## 2023-05-11 RX ORDER — LISINOPRIL 10 MG/1
10 TABLET ORAL DAILY
COMMUNITY

## 2024-03-07 ENCOUNTER — TRANSCRIBE ORDERS (OUTPATIENT)
Facility: HOSPITAL | Age: 69
End: 2024-03-07

## 2024-03-07 DIAGNOSIS — F10.220 ALCOHOL DEPENDENCE WITH UNCOMPLICATED INTOXICATION (HCC): Primary | ICD-10-CM

## 2025-07-16 ENCOUNTER — TRANSCRIBE ORDERS (OUTPATIENT)
Facility: HOSPITAL | Age: 70
End: 2025-07-16

## 2025-07-16 DIAGNOSIS — F17.200 TOBACCO USE DISORDER: Primary | ICD-10-CM
